# Patient Record
Sex: MALE | Race: WHITE | Employment: OTHER | URBAN - METROPOLITAN AREA
[De-identification: names, ages, dates, MRNs, and addresses within clinical notes are randomized per-mention and may not be internally consistent; named-entity substitution may affect disease eponyms.]

---

## 2017-05-02 ENCOUNTER — HOSPITAL ENCOUNTER (OUTPATIENT)
Dept: SURGERY | Age: 52
Discharge: HOME | End: 2017-05-02
Payer: COMMERCIAL

## 2017-05-02 ENCOUNTER — HOSPITAL ENCOUNTER (OUTPATIENT)
Dept: PHYSICAL THERAPY | Age: 52
Discharge: HOME | End: 2017-05-02
Payer: COMMERCIAL

## 2017-05-02 LAB
ANION GAP BLD CALC-SCNC: 6 MMOL/L (ref 7–16)
APPEARANCE UR: CLEAR
APTT PPP: 26.5 SEC (ref 23.5–31.7)
ATRIAL RATE: 81 BPM
BACTERIA SPEC CULT: NORMAL
BASOPHILS # BLD AUTO: 0 K/UL (ref 0–0.2)
BASOPHILS # BLD: 1 % (ref 0–2)
BILIRUB UR QL: NEGATIVE
BUN SERPL-MCNC: 13 MG/DL (ref 6–23)
CALCIUM SERPL-MCNC: 8.9 MG/DL (ref 8.3–10.4)
CALCULATED P AXIS, ECG09: 43 DEGREES
CALCULATED R AXIS, ECG10: -4 DEGREES
CALCULATED T AXIS, ECG11: 25 DEGREES
CHLORIDE SERPL-SCNC: 105 MMOL/L (ref 98–107)
CO2 SERPL-SCNC: 28 MMOL/L (ref 21–32)
COLOR UR: YELLOW
CREAT SERPL-MCNC: 1.11 MG/DL (ref 0.8–1.5)
DIAGNOSIS, 93000: NORMAL
DIFFERENTIAL METHOD BLD: ABNORMAL
EOSINOPHIL # BLD: 0.2 K/UL (ref 0–0.8)
EOSINOPHIL NFR BLD: 3 % (ref 0.5–7.8)
ERYTHROCYTE [DISTWIDTH] IN BLOOD BY AUTOMATED COUNT: 13.6 % (ref 11.9–14.6)
GLUCOSE SERPL-MCNC: 158 MG/DL (ref 65–100)
GLUCOSE UR STRIP.AUTO-MCNC: NEGATIVE MG/DL
HCT VFR BLD AUTO: 51.6 % (ref 41.1–50.3)
HGB BLD-MCNC: 17.7 G/DL (ref 13.6–17.2)
HGB UR QL STRIP: NEGATIVE
IMM GRANULOCYTES # BLD: 0 K/UL (ref 0–0.5)
IMM GRANULOCYTES NFR BLD AUTO: 0.7 % (ref 0–5)
INR PPP: 1 (ref 0.9–1.2)
KETONES UR QL STRIP.AUTO: NEGATIVE MG/DL
LEUKOCYTE ESTERASE UR QL STRIP.AUTO: NEGATIVE
LYMPHOCYTES # BLD AUTO: 36 % (ref 13–44)
LYMPHOCYTES # BLD: 2.2 K/UL (ref 0.5–4.6)
MCH RBC QN AUTO: 30.4 PG (ref 26.1–32.9)
MCHC RBC AUTO-ENTMCNC: 34.3 G/DL (ref 31.4–35)
MCV RBC AUTO: 88.5 FL (ref 79.6–97.8)
MONOCYTES # BLD: 0.5 K/UL (ref 0.1–1.3)
MONOCYTES NFR BLD AUTO: 8 % (ref 4–12)
NEUTS SEG # BLD: 3.2 K/UL (ref 1.7–8.2)
NEUTS SEG NFR BLD AUTO: 51 % (ref 43–78)
NITRITE UR QL STRIP.AUTO: NEGATIVE
P-R INTERVAL, ECG05: 172 MS
PH UR STRIP: 6 [PH] (ref 5–9)
PLATELET # BLD AUTO: 161 K/UL (ref 150–450)
PMV BLD AUTO: 10.9 FL (ref 10.8–14.1)
POTASSIUM SERPL-SCNC: 4.2 MMOL/L (ref 3.5–5.1)
PROT UR STRIP-MCNC: NEGATIVE MG/DL
PROTHROMBIN TIME: 10.6 SEC (ref 9.6–12)
Q-T INTERVAL, ECG07: 348 MS
QRS DURATION, ECG06: 78 MS
QTC CALCULATION (BEZET), ECG08: 404 MS
RBC # BLD AUTO: 5.83 M/UL (ref 4.23–5.67)
SERVICE CMNT-IMP: NORMAL
SODIUM SERPL-SCNC: 139 MMOL/L (ref 136–145)
SP GR UR REFRACTOMETRY: 1.02 (ref 1–1.02)
UROBILINOGEN UR QL STRIP.AUTO: 0.2 EU/DL (ref 0.2–1)
VENTRICULAR RATE, ECG03: 81 BPM
WBC # BLD AUTO: 6.1 K/UL (ref 4.3–11.1)

## 2017-05-02 PROCEDURE — 97161 PT EVAL LOW COMPLEX 20 MIN: CPT

## 2017-05-02 RX ORDER — ALPRAZOLAM 1 MG/1
1 TABLET ORAL
COMMUNITY

## 2017-05-02 RX ORDER — IBUPROFEN 200 MG
200 TABLET ORAL
COMMUNITY
End: 2017-05-25

## 2017-05-02 RX ORDER — TADALAFIL 5 MG/1
5 TABLET ORAL
COMMUNITY

## 2017-05-02 RX ORDER — ACETAMINOPHEN 325 MG/1
TABLET ORAL
COMMUNITY

## 2017-05-02 RX ORDER — ATORVASTATIN CALCIUM 20 MG/1
20 TABLET, FILM COATED ORAL DAILY
COMMUNITY

## 2017-05-02 RX ORDER — LEVOTHYROXINE SODIUM 100 UG/1
100 TABLET ORAL
COMMUNITY

## 2017-05-02 NOTE — ADVANCED PRACTICE NURSE
Total Joint Surgery Preoperative Chart Review      Patient ID:  Queta Ortiz  [id number]  89 y.o.  1965  Surgeon: Dr. Xena Curry  Date of Surgery: 5/24/2017  Procedure: Total Right Knee Arthroplasty  Primary Care Physician: Anusha Higgins [phone]  Specialty Physician(s):      Subjective:   Queta Ortiz is a 47 y.o. WHITE OR  male who presents for preoperative evaluation for Total Right Knee arthroplasty. This is a preoperative chart review note based on data collected by the nurse at the surgical Pre-Assessment visit. Past Medical History:   Diagnosis Date    Anxiety     managed with medication     Arrhythmia     A fib dx 10/31/15-IV Cardiazem given and converted to NSR. Patient took Cardiazem PO for 6 months and then discontinued. Now takes 81 mg ASA daily. Last EKG 5/2/17 NSR.  Arthritis     OA-bilateral knees     CAD (coronary artery disease)     mild MI in 2001- no intervention/no blockages; then 10/31/15 \"I drank heavily and I went into a fib\"- went to the ED- started on Cardizem- stress test normal    Chronic pain     knees    History of MI (myocardial infarction) 2001    Followed by Dr. Sharmila Horne at [facility].       Hypertension     controlled with med    Morbid obesity ([address] 6/3/16    BMI- 35.5  (verbal)    Nausea & vomiting     no problems with nausea after knee scope     Thyroid disease     hypo- on med      Past Surgical History:   Procedure Laterality Date    HX COLONOSCOPY      HX KNEE ARTHROSCOPY Left 06/2016    HX ORTHOPAEDIC Right     age 14- X 2 - ? meniscus/ cartilage    HX TONSILLECTOMY      age 23     Family History   Problem Relation Age of Onset    Diabetes Mother      type 2; insulin dep    Hypertension Mother     Heart Disease Father     Hypertension Father       Social History   Substance Use Topics    Smoking status: Never Smoker    Smokeless tobacco: Never Used    Alcohol use No      Comment: hx of heavy use       Prior to Admission medications    Medication Sig Start Date End Date Taking? Authorizing Provider   ALPRAZolam Rebecca Tashi) 1 mg tablet Take 1 mg by mouth nightly as needed for Anxiety. Yes Historical Provider   levothyroxine (SYNTHROID) 100 mcg tablet Take 100 mcg by mouth Daily (before breakfast). Take DOS per anesthesia protocol. Yes Historical Provider   atorvastatin (LIPITOR) 20 mg tablet Take 20 mg by mouth daily. Take DOS per anesthesia protocol   Yes Historical Provider   tadalafil (CIALIS) 5 mg tablet Take 5 mg by mouth daily as needed. Yes Historical Provider   ibuprofen (MOTRIN) 200 mg tablet Take 200 mg by mouth every six (6) hours as needed for Pain. Yes Historical Provider   acetaminophen (TYLENOL) 325 mg tablet Take  by mouth every four (4) hours as needed for Pain. Yes Historical Provider   enalapril (VASOTEC) 10 mg tablet Take 10 mg by mouth every morning. Yes Historical Provider   TESTOSTERONE IM by IntraMUSCular route every Wednesday. Yes Historical Provider   multivitamin (ONE A DAY) tablet Take 1 Tab by mouth daily. Last dose 6/8/16   Yes Historical Provider   aspirin delayed-release 81 mg tablet Take 81 mg by mouth every morning. Take DOS per anesthesia protocol.    Yes Historical Provider     No Known Allergies       Objective:     Physical Exam:   Patient Vitals for the past 24 hrs:   Temp Pulse Resp BP SpO2   05/02/17 1157 97 °F (36.1 °C) 82 18 156/74 95 %       ECG:    EKG Results     Procedure 720 Value Units Date/Time    EKG, 12 LEAD, INITIAL [[id number]] Collected:  05/02/17 1209    Order Status:  Completed Updated:  05/02/17 1342     Ventricular Rate 81 BPM      Atrial Rate 81 BPM      P-R Interval 172 ms      QRS Duration 78 ms      Q-T Interval 348 ms      QTC Calculation (Bezet) 404 ms      Calculated P Axis 43 degrees      Calculated R Axis -4 degrees      Calculated T Axis 25 degrees      Diagnosis --     Normal sinus rhythm  Normal ECG  No previous ECGs available  Confirmed by [address] (), Makeda Lutz ([id number]) on 5/2/2017 1:42:26 PM            Data Review:   Labs:   Recent Results (from the past 24 hour(s))   CBC WITH AUTOMATED DIFF    Collection Time: 05/02/17 11:18 AM   Result Value Ref Range    WBC 6.1 4.3 - 11.1 K/uL    RBC 5.83 (H) 4.23 - 5.67 M/uL    HGB 17.7 (H) 13.6 - 17.2 g/dL    HCT 51.6 (H) 41.1 - 50.3 %    MCV 88.5 79.6 - 97.8 FL    MCH 30.4 26.1 - 32.9 PG    MCHC 34.3 31.4 - 35.0 g/dL    RDW 13.6 11.9 - 14.6 %    PLATELET 965 910 - 928 K/uL    MPV 10.9 10.8 - 14.1 FL    DF AUTOMATED      NEUTROPHILS 51 43 - 78 %    LYMPHOCYTES 36 13 - 44 %    MONOCYTES 8 4.0 - 12.0 %    EOSINOPHILS 3 0.5 - 7.8 %    BASOPHILS 1 0.0 - 2.0 %    IMMATURE GRANULOCYTES 0.7 0.0 - 5.0 %    ABS. NEUTROPHILS 3.2 1.7 - 8.2 K/UL    ABS. LYMPHOCYTES 2.2 0.5 - 4.6 K/UL    ABS. MONOCYTES 0.5 0.1 - 1.3 K/UL    ABS. EOSINOPHILS 0.2 0.0 - 0.8 K/UL    ABS. BASOPHILS 0.0 0.0 - 0.2 K/UL    ABS. IMM.  GRANS. 0.0 0.0 - 0.5 K/UL   PROTHROMBIN TIME + INR    Collection Time: 05/02/17 11:18 AM   Result Value Ref Range    Prothrombin time 10.6 9.6 - 12.0 sec    INR 1.0 0.9 - 1.2     PTT    Collection Time: 05/02/17 11:18 AM   Result Value Ref Range    aPTT 26.5 23.5 - 97.4 SEC   METABOLIC PANEL, BASIC    Collection Time: 05/02/17 11:18 AM   Result Value Ref Range    Sodium 139 136 - 145 mmol/L    Potassium 4.2 3.5 - 5.1 mmol/L    Chloride 105 98 - 107 mmol/L    CO2 28 21 - 32 mmol/L    Anion gap 6 (L) 7 - 16 mmol/L    Glucose 158 (H) 65 - 100 mg/dL    BUN 13 6 - 23 MG/DL    Creatinine 1.11 0.8 - 1.5 MG/DL    GFR est AA >60 >60 ml/min/1.73m2    GFR est non-AA >60 >60 ml/min/1.73m2    Calcium 8.9 8.3 - 10.4 MG/DL   URINALYSIS W/ RFLX MICROSCOPIC    Collection Time: 05/02/17 11:18 AM   Result Value Ref Range    Color YELLOW      Appearance CLEAR      Specific gravity 1.020 1.001 - 1.023      pH (UA) 6.0 5.0 - 9.0      Protein NEGATIVE  NEG mg/dL    Glucose NEGATIVE  mg/dL    Ketone NEGATIVE  NEG mg/dL    Bilirubin NEGATIVE NEG      Blood NEGATIVE  NEG      Urobilinogen 0.2 0.2 - 1.0 EU/dL    Nitrites NEGATIVE  NEG      Leukocyte Esterase NEGATIVE  NEG     EKG, 12 LEAD, INITIAL    Collection Time: 05/02/17 12:09 PM   Result Value Ref Range    Ventricular Rate 81 BPM    Atrial Rate 81 BPM    P-R Interval 172 ms    QRS Duration 78 ms    Q-T Interval 348 ms    QTC Calculation (Bezet) 404 ms    Calculated P Axis 43 degrees    Calculated R Axis -4 degrees    Calculated T Axis 25 degrees    Diagnosis       Normal sinus rhythm  Normal ECG  No previous ECGs available  Confirmed by Otilio Pope MD (), Riley Covarrubias ([id number]) on 5/2/2017 1:42:26 PM           Problem List:  )There is no problem list on file for this patient. Total Joint Surgery Pre-Assessment Recommendations:           He is a moderate risk for sleep apnea but refuses to have additional work up at this time. Will initiate perioperative STAN precautions. Recommend continuous saturation monitoring hours of sleep, during hospitalization.           Signed By: GREGORIO Ty    May 2, 2017

## 2017-05-02 NOTE — PERIOP NOTES
Labs dated 5/2/17 routed via [facility] to patients PCP, Dr. Eber La, per Dr. Rosa Jara' request.

## 2017-05-02 NOTE — PROGRESS NOTES
Mahamed Alert  : (57 y.o.) [address] at [facility]  [address], [address]  [phone]       Physical Therapy Prehab Plan of Treatment and Evaluation Summary:2017    ICD-10: Treatment Diagnosis:   · Pain in Right Knee (M25.561)  · Stiffness of Right Knee, Not elsewhere classified (M25.661)  · Difficulty in walking, Not elsewhere classified (R26.2)  Precautions/Allergies:   Review of patient's allergies indicates no known allergies. MEDICAL/REFERRING DIAGNOSIS:  Unilateral primary osteoarthritis, right knee [M17.11]  REFERRING PHYSICIAN: Teresa Johnson., *  DATE OF SURGERY: 17   Assessment:   Comments:  Pt. Plans to go home with spouse. PROBLEM LIST (Impacting functional limitations):  Mr. Stacey Owens presents with the following right lower extremity(s) problems:  1. Strength  2. Range of Motion  3. Home Exercise Program  4. Pain   INTERVENTIONS PLANNED:  1. Home Exercise Program  2. Educational Discussion     TREATMENT PLAN: Effective Dates: 2017 TO 2017. Frequency/Duration: Patient to continue to perform home exercise program at least twice per day up until his surgery. GOALS: (Goals have been discussed and agreed upon with patient.)  Discharge Goals: Time Frame: 1 Day  1. Patient will demonstrate independence with a home exercise program designed to increase strength, range of motion and pain control to minimize functional deficits and optimize patient for total joint replacement. Rehabilitation Potential For Stated Goals: Good  Regarding [place] therapy, I certify that the treatment plan above will be carried out by a therapist or under their direction.   Thank you for this referral,  Ash Sheets, PT               HISTORY:   Present Symptoms:  Pain Intensity 1:  (7 at worst)  Pain Location 1: Knee   History of Present Injury/Illness (Reason for Referral):  Medical/Referring Diagnosis: Unilateral primary osteoarthritis, right knee [M17.11]   Past Medical History/Comorbidities:   Mr. Hardy Jimenes  has a past medical history of Anxiety; Arrhythmia; Arthritis; CAD (coronary artery disease); Chronic pain; History of MI (myocardial infarction) (2001); Hypertension; Morbid obesity ([facility] [address] (6/3/16); Nausea & vomiting; and Thyroid disease. He also has no past medical history of Adverse effect of anesthesia; Difficult intubation; Malignant hyperthermia due to anesthesia; or Pseudocholinesterase deficiency. Mr. Hardy Jimenes  has a past surgical history that includes orthopaedic (Right); tonsillectomy; knee arthroscopy (Left, 06/2016); and colonoscopy.   Social History/Living Environment:   Home Environment: Private residence  # Steps to Enter: 1  Rails to Enter: No  One/Two Story Residence: One story  Living Alone: No  Support Systems: Spouse/Significant Other/Partner  Patient Expects to be Discharged to[de-identified] Private residence  Current DME Used/Available at Home: None  Tub or Shower Type: Shower  Work/Activity:  Owns a restaurant  Dominant Side:  RIGHT  Current Medications:  See Pre-assessment nursing note   Number of Personal Factors/Comorbidities that affect the Plan of Care: 0: LOW COMPLEXITY   EXAMINATION:   ADLs (Current Functional Status):   Ambulation:  [x] Independent  [] Walk Indoors Only  [] Walk Outdoors  [] Use Assistive Device  [] Use Wheelchair Only Dressing:  [x] [address] from Someone for:  [] Sock/Shoes  [] Pants  [] Everything   Bathing/Showering:   [x] Independent  [] Requires Assistance from Someone  [] [address] Only Household Activities:  [x] Routine house and yard work  [] Light Housework Only  [] None   Observation/Orthostatic Postural Assessment:   Exceptions to Auditude shoulders  ROM/Flexibility:   Gross Assessment: Yes  AROM: Within functional limits (left LE)                       RLE Assessment  RLE Assessment (WDL): Exceptions to WDL  RLE AROM  R Knee Flexion: 125  R Knee Extension: 3   Strength:   Gross Assessment: Yes  Strength: Within functional limits (left LE)              RLE Strength  R Knee Flexion: 4  R Knee Extension: 4   Functional Mobility:    Gross Assessment: Yes    Gait Description (WDL): Exceptions to WDL  Stand to Sit: Independent, Additional time  Sit to Stand: Independent, Additional time  Bed to Chair: Independent, Additional time  Distance (ft): 250 Feet (ft)  Ambulation - Level of Assistance: Independent  Stance: Right decreased  Gait Abnormalities: Antalgic          Balance:    Sitting: Intact  Standing: Intact   Body Structures Involved:  1. Bones  2. Joints  3. Muscles  4. Ligaments Body Functions Affected:  1. Movement Related Activities and Participation Affected:  1. Mobility   Number of elements that affect the Plan of Care: 1-2: LOW COMPLEXITY   CLINICAL PRESENTATION:   Presentation: Stable and uncomplicated: LOW COMPLEXITY   CLINICAL DECISION MAKING:   Outcome Measure: Tool Used: Lower Extremity Functional Scale (LEFS)  Score:  Initial: 51/80 Most Recent: X/80 (Date: -- )   Interpretation of Score: 20 questions each scored on a 5 point scale with 0 representing \"extreme difficulty or unable to perform\" and 4 representing \"no difficulty\". The lower the score, the greater the functional disability. 80/80 represents no disability. Minimal detectable change is 9 points. Score 80 79-65 64-49 48-33 32-17 16-1 0   Modifier CH CI CJ CK CL CM CN     ? Mobility - Walking and Moving Around:     - CURRENT STATUS: CJ - 20%-39% impaired, limited or restricted    - GOAL STATUS: CJ - 20%-39% impaired, limited or restricted    - D/C STATUS:  CJ - 20%-39% impaired, limited or restricted  Medical Necessity:   · Mr. Hardy Jimenes is expected to optimize his lower extremity strength and ROM in preparation for joint replacement surgery.   Reason for Services/Other Comments:  · Achieve baseline assesment of musculoskeletal system, functional mobility and home environment. , educate in PT HEP in preparation for surgery, educate in hospital plan of care. Use of outcome tool(s) and clinical judgement create a POC that gives a: Clear prediction of patient's progress: LOW COMPLEXITY   TREATMENT:   Treatment/Session Assessment:  Patient was instructed in PT- HEP to increase strength and ROM in LEs. Answered all questions. · Post session pain:  No complaints  · Compliance with Program/Exercises: compliant most of the time.   Total Treatment Duration:  PT Patient Time In/Time Out  Time In: 1050  Time Out: [address], PT

## 2017-05-02 NOTE — PERIOP NOTES
Patient verified name, , and surgery as listed in [facility]. Type 3 surgery, PAT joint assessment complete. Labs per surgeon: cbc, bmp, ua, pt/inr, ptt, mrsa/mssa swab, T&S DOS; results within anesthesia limits. MRSA/MSSA swab results pending-charge nurse to follow-up. Labs per anesthesia protocol: All required lab work included in surgeon's orders. EKG: completed 17; results within anesthesia limits. Previous Cardiology note (6/10/16), Echo (11/2/15), Stress (11/23/15), and EKG (11/17/15) placed on chart for anesthesia reference. Hibiclens and instructions given per hospital policy. Nasal Swab collected per MD order and instructions for Mupirocin nasal ointment if required. Patient provided with handouts including Guide to Surgery, Pain Management, Hand Hygiene, Blood Transfusion Education, and [place] [facility] Brochure. Patient answered medical/surgical history questions at their best of ability. All prior to admission medications documented in [facility]. Original medication prescription bottle NOT visualized during patient appointment. Patient instructed to hold all vitamins 7 days prior to surgery and NSAIDS 5 days prior to surgery. Medications to be held prior to surgery: Multivitamin and Ibuprofen. Patient instructed to continue previous medications as prescribed prior to surgery and to take the following medications the day of surgery according to anesthesia guidelines with a small sip of water: Tylenol if needed, 81 mg ASA, Atorvastatin, and Synthroid. Patient taught back and verbalized understanding.

## 2017-05-02 NOTE — PERIOP NOTES
Recent Results (from the past 12 hour(s))   CBC WITH AUTOMATED DIFF    Collection Time: 05/02/17 11:18 AM   Result Value Ref Range    WBC 6.1 4.3 - 11.1 K/uL    RBC 5.83 (H) 4.23 - 5.67 M/uL    HGB 17.7 (H) 13.6 - 17.2 g/dL    HCT 51.6 (H) 41.1 - 50.3 %    MCV 88.5 79.6 - 97.8 FL    MCH 30.4 26.1 - 32.9 PG    MCHC 34.3 31.4 - 35.0 g/dL    RDW 13.6 11.9 - 14.6 %    PLATELET 991 165 - 205 K/uL    MPV 10.9 10.8 - 14.1 FL    DF AUTOMATED      NEUTROPHILS 51 43 - 78 %    LYMPHOCYTES 36 13 - 44 %    MONOCYTES 8 4.0 - 12.0 %    EOSINOPHILS 3 0.5 - 7.8 %    BASOPHILS 1 0.0 - 2.0 %    IMMATURE GRANULOCYTES 0.7 0.0 - 5.0 %    ABS. NEUTROPHILS 3.2 1.7 - 8.2 K/UL    ABS. LYMPHOCYTES 2.2 0.5 - 4.6 K/UL    ABS. MONOCYTES 0.5 0.1 - 1.3 K/UL    ABS. EOSINOPHILS 0.2 0.0 - 0.8 K/UL    ABS. BASOPHILS 0.0 0.0 - 0.2 K/UL    ABS. IMM.  GRANS. 0.0 0.0 - 0.5 K/UL   PROTHROMBIN TIME + INR    Collection Time: 05/02/17 11:18 AM   Result Value Ref Range    Prothrombin time 10.6 9.6 - 12.0 sec    INR 1.0 0.9 - 1.2     PTT    Collection Time: 05/02/17 11:18 AM   Result Value Ref Range    aPTT 26.5 23.5 - 00.0 SEC   METABOLIC PANEL, BASIC    Collection Time: 05/02/17 11:18 AM   Result Value Ref Range    Sodium 139 136 - 145 mmol/L    Potassium 4.2 3.5 - 5.1 mmol/L    Chloride 105 98 - 107 mmol/L    CO2 28 21 - 32 mmol/L    Anion gap 6 (L) 7 - 16 mmol/L    Glucose 158 (H) 65 - 100 mg/dL    BUN 13 6 - 23 MG/DL    Creatinine 1.11 0.8 - 1.5 MG/DL    GFR est AA >60 >60 ml/min/1.73m2    GFR est non-AA >60 >60 ml/min/1.73m2    Calcium 8.9 8.3 - 10.4 MG/DL   URINALYSIS W/ RFLX MICROSCOPIC    Collection Time: 05/02/17 11:18 AM   Result Value Ref Range    Color YELLOW      Appearance CLEAR      Specific gravity 1.020 1.001 - 1.023      pH (UA) 6.0 5.0 - 9.0      Protein NEGATIVE  NEG mg/dL    Glucose NEGATIVE  mg/dL    Ketone NEGATIVE  NEG mg/dL    Bilirubin NEGATIVE  NEG      Blood NEGATIVE  NEG      Urobilinogen 0.2 0.2 - 1.0 EU/dL    Nitrites NEGATIVE NEG      Leukocyte Esterase NEGATIVE  NEG     EKG, 12 LEAD, INITIAL    Collection Time: 05/02/17 12:09 PM   Result Value Ref Range    Ventricular Rate 81 BPM    Atrial Rate 81 BPM    P-R Interval 172 ms    QRS Duration 78 ms    Q-T Interval 348 ms    QTC Calculation (Bezet) 404 ms    Calculated P Axis 43 degrees    Calculated R Axis -4 degrees    Calculated T Axis 25 degrees    Diagnosis       Normal sinus rhythm  Normal ECG  No previous ECGs available

## 2017-05-02 NOTE — PERIOP NOTES
Previous office note, ekg, stress test, and echo requested from [facility] ([phone]) to be faxed to [phone].

## 2017-05-02 NOTE — PERIOP NOTES
Received previous cardiology note (6/10/16), Echo (11/02/15), Stress (11/23/15) and EKG (11/17/15) from [address] [place] [address]. Records placed on chart for anesthesia reference.

## 2017-05-03 VITALS
SYSTOLIC BLOOD PRESSURE: 156 MMHG | BODY MASS INDEX: 36.29 KG/M2 | WEIGHT: 245 LBS | HEIGHT: 69 IN | DIASTOLIC BLOOD PRESSURE: 74 MMHG | OXYGEN SATURATION: 95 % | RESPIRATION RATE: 18 BRPM | HEART RATE: 82 BPM | TEMPERATURE: 97 F

## 2017-05-03 NOTE — PERIOP NOTES
5/2/2017  4:59 PM - Neftali, Lab In [facility]   Component Results   Component Value Flag Ref Range Units Status   Special Requests: NASAL     Final   Culture result:      Final   SA target not detected.                                 A MRSA NEGATIVE, SA NEGATIVE test result does not preclude MRSA or SA nasal colonization.

## 2017-05-03 NOTE — PROGRESS NOTES
05/02/17 1030   Oxygen Therapy   O2 Sat (%) 97 %   Pulse via Oximetry 89 beats per minute   O2 Device Room air   Pre-Treatment   Breath Sounds Bilateral Clear   Pre FEV1 (liters) 2.7 liters   % Predicted 72     Initial respiratory Assessment completed with pt. Pt was interviewed and evaluated in Joint camp prior to surgery. Patient ID:  Ernestina Paredes  [id number]  43 y.o.  1965  Surgeon: Dr. Renata Jeffries  Date of Surgery: 5/24/2017  Procedure: Total Right Knee Arthroplasty  Primary Care Physician: Genie Salmon -[phone]  Specialists:                                  Pt instructed in the use of Incentive Spirometry. Pt instructed to bring Incentive Spirometer back on date of surgery & to start using Is upon return to pt room. Pt taught proper cough technique      History of smoking:   MARIJUANA IN PAST     Quit date:    Secondhand smoke:      Past procedures with Oxygen desaturation:NONE    Past Medical History:   Diagnosis Date    Abnormal hemoglobin (Hgb) ([facility] [address] 05/02/2017    Hgb 17.7-patient takes weekly testosterone injection     Anxiety     managed with medication     Arrhythmia     [place] fib dx 10/31/15 after binge drinking. IV Cardiazem given and converted to NSR. Patient took Cardiazem PO for 6 months and then discontinued. Now takes 81 mg ASA daily. Last EKG 5/2/17 NSR. No hx of cardioversion.  Arthritis     OA-bilateral knees     CAD (coronary artery disease)     mild MI per patient in 2001- no intervention/no blockages; then 10/31/15 \"I drank heavily and I went into a fib\"- went to the ED- started on Cardizem- stress test normal    Chronic pain     knees    H/O echocardiogram 11/02/2015    EF 55-60%, Trace mitral regurgitation, Mild tricuspid regurgitation, diastolic dysfunction grade II.  History of MI (myocardial infarction) 2001    Followed by Dr. Winston Gonzalez at [facility].       Hypertension     controlled with med    Morbid obesity ([facility] [address] 06/03/2016    Nausea & vomiting     no problems with nausea after knee scope     Thyroid disease     hypo- on med                                                                                                                                                          Respiratory history:                                  DENIES SOB                                  Respiratory meds:                                                         FAMILY PRESENT:                   NO                                        PAST SLEEP STUDY:             NO  HX OF STAN:                             NO                                     STAN assessment:                                               SLEEPS ON    STOMACH                                                 PHYSICAL EXAM   Body mass index is 36.18 kg/(m^2).    Visit Vitals    /74 (BP 1 Location: Left arm, BP Patient Position: At rest;Sitting)    Pulse 82    Temp 97 °F (36.1 °C)    Resp 18    Ht 5' 9\" (1.753 m)    Wt 111.1 kg (245 lb)    SpO2 95%    BMI 36.18 kg/m2     Neck circumference:  48    cm    Loud snoring: DENIES    Witnessed apnea or wakening gasping or choking:,DENIES,     Awakens with headaches:DENIES    Morning or daytime tiredness/ sleepiness:   TIRED   NAPS 2 HOURS TWICE A WEEK    Dry mouth or sore throat in morning: DENIES    Freidman stage:3    SACS score:29    STOP/BAN                              CPAP:NONE               CONT SAT HS          Referrals:    Pt. Phone Number:

## 2017-05-23 ENCOUNTER — ANESTHESIA EVENT (OUTPATIENT)
Dept: SURGERY | Age: 52
DRG: 470 | End: 2017-05-23
Payer: COMMERCIAL

## 2017-05-23 RX ORDER — ONDANSETRON 2 MG/ML
4 INJECTION INTRAMUSCULAR; INTRAVENOUS
Status: CANCELLED | OUTPATIENT
Start: 2017-05-23

## 2017-05-23 RX ORDER — NALBUPHINE HYDROCHLORIDE 20 MG/ML
5 INJECTION, SOLUTION INTRAMUSCULAR; INTRAVENOUS; SUBCUTANEOUS
Status: CANCELLED | OUTPATIENT
Start: 2017-05-23

## 2017-05-23 RX ORDER — NALOXONE HYDROCHLORIDE 0.4 MG/ML
0.1 INJECTION, SOLUTION INTRAMUSCULAR; INTRAVENOUS; SUBCUTANEOUS
Status: CANCELLED | OUTPATIENT
Start: 2017-05-23

## 2017-05-23 RX ORDER — HYDROMORPHONE HYDROCHLORIDE 2 MG/ML
0.5 INJECTION, SOLUTION INTRAMUSCULAR; INTRAVENOUS; SUBCUTANEOUS
Status: CANCELLED | OUTPATIENT
Start: 2017-05-23

## 2017-05-23 RX ORDER — FLUMAZENIL 0.1 MG/ML
0.2 INJECTION INTRAVENOUS
Status: CANCELLED | OUTPATIENT
Start: 2017-05-23

## 2017-05-23 RX ORDER — SODIUM CHLORIDE 0.9 % (FLUSH) 0.9 %
5-10 SYRINGE (ML) INJECTION AS NEEDED
Status: CANCELLED | OUTPATIENT
Start: 2017-05-23

## 2017-05-23 RX ORDER — OXYCODONE HYDROCHLORIDE 5 MG/1
5 TABLET ORAL
Status: CANCELLED | OUTPATIENT
Start: 2017-05-23

## 2017-05-24 ENCOUNTER — HOSPITAL ENCOUNTER (INPATIENT)
Age: 52
LOS: 1 days | Discharge: HOME HEALTH | DRG: 470 | End: 2017-05-25
Attending: ORTHOPAEDIC SURGERY | Admitting: ORTHOPAEDIC SURGERY
Payer: COMMERCIAL

## 2017-05-24 ENCOUNTER — ANESTHESIA (OUTPATIENT)
Dept: SURGERY | Age: 52
DRG: 470 | End: 2017-05-24
Payer: COMMERCIAL

## 2017-05-24 DIAGNOSIS — Z96.651 STATUS POST TOTAL RIGHT KNEE REPLACEMENT: Primary | ICD-10-CM

## 2017-05-24 LAB
ABO + RH BLD: NORMAL
BLOOD GROUP ANTIBODIES SERPL: NORMAL
GLUCOSE BLD STRIP.AUTO-MCNC: 105 MG/DL (ref 65–100)
HGB BLD-MCNC: 16.8 G/DL (ref 13.6–17.2)
SPECIMEN EXP DATE BLD: NORMAL

## 2017-05-24 PROCEDURE — 74011250636 HC RX REV CODE- 250/636: Performed by: ANESTHESIOLOGY

## 2017-05-24 PROCEDURE — 77030019557 HC ELECTRD VES SEAL MEDT -F: Performed by: ORTHOPAEDIC SURGERY

## 2017-05-24 PROCEDURE — 77030003602 HC NDL NRV BLK BBMI -B: Performed by: ANESTHESIOLOGY

## 2017-05-24 PROCEDURE — 74011000250 HC RX REV CODE- 250: Performed by: ANESTHESIOLOGY

## 2017-05-24 PROCEDURE — 76010010054 HC POST OP PAIN BLOCK: Performed by: ORTHOPAEDIC SURGERY

## 2017-05-24 PROCEDURE — 76010000162 HC OR TIME 1.5 TO 2 HR INTENSV-TIER 1: Performed by: ORTHOPAEDIC SURGERY

## 2017-05-24 PROCEDURE — 77030034849: Performed by: ORTHOPAEDIC SURGERY

## 2017-05-24 PROCEDURE — 74011000302 HC RX REV CODE- 302: Performed by: ORTHOPAEDIC SURGERY

## 2017-05-24 PROCEDURE — 65270000029 HC RM PRIVATE

## 2017-05-24 PROCEDURE — 77030012935 HC DRSG AQUACEL BMS -B: Performed by: ORTHOPAEDIC SURGERY

## 2017-05-24 PROCEDURE — 77030011208: Performed by: ORTHOPAEDIC SURGERY

## 2017-05-24 PROCEDURE — 76942 ECHO GUIDE FOR BIOPSY: CPT | Performed by: ORTHOPAEDIC SURGERY

## 2017-05-24 PROCEDURE — 94762 N-INVAS EAR/PLS OXIMTRY CONT: CPT

## 2017-05-24 PROCEDURE — 77030031139 HC SUT VCRL2 J&J -A: Performed by: ORTHOPAEDIC SURGERY

## 2017-05-24 PROCEDURE — 77030003665 HC NDL SPN BBMI -A: Performed by: ANESTHESIOLOGY

## 2017-05-24 PROCEDURE — 77030008467 HC STPLR SKN COVD -B: Performed by: ORTHOPAEDIC SURGERY

## 2017-05-24 PROCEDURE — 77030007880 HC KT SPN EPDRL BBMI -B: Performed by: ANESTHESIOLOGY

## 2017-05-24 PROCEDURE — 77030011640 HC PAD GRND REM COVD -A: Performed by: ORTHOPAEDIC SURGERY

## 2017-05-24 PROCEDURE — 77030006789 HC BLD SAW OSC STRY -C: Performed by: ORTHOPAEDIC SURGERY

## 2017-05-24 PROCEDURE — 86900 BLOOD TYPING SEROLOGIC ABO: CPT | Performed by: PHYSICIAN ASSISTANT

## 2017-05-24 PROCEDURE — 85018 HEMOGLOBIN: CPT | Performed by: PHYSICIAN ASSISTANT

## 2017-05-24 PROCEDURE — 77030002966 HC SUT PDS J&J -A: Performed by: ORTHOPAEDIC SURGERY

## 2017-05-24 PROCEDURE — 0SRC0J9 REPLACEMENT OF RIGHT KNEE JOINT WITH SYNTHETIC SUBSTITUTE, CEMENTED, OPEN APPROACH: ICD-10-PCS | Performed by: ORTHOPAEDIC SURGERY

## 2017-05-24 PROCEDURE — 82962 GLUCOSE BLOOD TEST: CPT

## 2017-05-24 PROCEDURE — 77030006720 HC BLD PAT RMR ZIMM -B: Performed by: ORTHOPAEDIC SURGERY

## 2017-05-24 PROCEDURE — 74011250636 HC RX REV CODE- 250/636: Performed by: ORTHOPAEDIC SURGERY

## 2017-05-24 PROCEDURE — 77030020782 HC GWN BAIR PAWS FLX 3M -B: Performed by: ANESTHESIOLOGY

## 2017-05-24 PROCEDURE — 74011000250 HC RX REV CODE- 250

## 2017-05-24 PROCEDURE — 77030002912 HC SUT ETHBND J&J -A: Performed by: ORTHOPAEDIC SURGERY

## 2017-05-24 PROCEDURE — 76060000034 HC ANESTHESIA 1.5 TO 2 HR: Performed by: ORTHOPAEDIC SURGERY

## 2017-05-24 PROCEDURE — 86580 TB INTRADERMAL TEST: CPT | Performed by: ORTHOPAEDIC SURGERY

## 2017-05-24 PROCEDURE — 74011250636 HC RX REV CODE- 250/636

## 2017-05-24 PROCEDURE — 94760 N-INVAS EAR/PLS OXIMETRY 1: CPT

## 2017-05-24 PROCEDURE — C1776 JOINT DEVICE (IMPLANTABLE): HCPCS | Performed by: ORTHOPAEDIC SURGERY

## 2017-05-24 PROCEDURE — 77030012890

## 2017-05-24 PROCEDURE — 77030035236 HC SUT PDS STRATFX BARB J&J -B: Performed by: ORTHOPAEDIC SURGERY

## 2017-05-24 PROCEDURE — 74011250636 HC RX REV CODE- 250/636: Performed by: PHYSICIAN ASSISTANT

## 2017-05-24 PROCEDURE — 74011000250 HC RX REV CODE- 250: Performed by: ORTHOPAEDIC SURGERY

## 2017-05-24 PROCEDURE — 77030013727 HC IRR FAN PULSVC ZIMM -B: Performed by: ORTHOPAEDIC SURGERY

## 2017-05-24 PROCEDURE — 77010033678 HC OXYGEN DAILY

## 2017-05-24 PROCEDURE — 36415 COLL VENOUS BLD VENIPUNCTURE: CPT | Performed by: PHYSICIAN ASSISTANT

## 2017-05-24 PROCEDURE — 74011000258 HC RX REV CODE- 258: Performed by: ORTHOPAEDIC SURGERY

## 2017-05-24 PROCEDURE — 76210000016 HC OR PH I REC 1 TO 1.5 HR: Performed by: ORTHOPAEDIC SURGERY

## 2017-05-24 PROCEDURE — 77030018836 HC SOL IRR NACL ICUM -A: Performed by: ORTHOPAEDIC SURGERY

## 2017-05-24 PROCEDURE — 74011250637 HC RX REV CODE- 250/637: Performed by: ANESTHESIOLOGY

## 2017-05-24 PROCEDURE — 77030032490 HC SLV COMPR SCD KNE COVD -B

## 2017-05-24 DEVICE — COMPONENT PAT DIA35MM THK10MM SUPERIOR/INFERIOR KNEE: Type: IMPLANTABLE DEVICE | Site: KNEE | Status: FUNCTIONAL

## 2017-05-24 DEVICE — INSERT TIB SZ 5 THK11MM UNIV KNEE POLYETH CNDYL STBL PRI: Type: IMPLANTABLE DEVICE | Site: KNEE | Status: FUNCTIONAL

## 2017-05-24 DEVICE — IMPLANTABLE DEVICE: Type: IMPLANTABLE DEVICE | Site: KNEE | Status: FUNCTIONAL

## 2017-05-24 DEVICE — BASEPLT TIB PC TRITNM SZ 5 -- TRIATHLON: Type: IMPLANTABLE DEVICE | Site: KNEE | Status: FUNCTIONAL

## 2017-05-24 RX ORDER — ONDANSETRON 2 MG/ML
INJECTION INTRAMUSCULAR; INTRAVENOUS AS NEEDED
Status: DISCONTINUED | OUTPATIENT
Start: 2017-05-24 | End: 2017-05-24 | Stop reason: HOSPADM

## 2017-05-24 RX ORDER — LEVOTHYROXINE SODIUM 100 UG/1
100 TABLET ORAL
Status: DISCONTINUED | OUTPATIENT
Start: 2017-05-25 | End: 2017-05-25 | Stop reason: HOSPADM

## 2017-05-24 RX ORDER — MIDAZOLAM HYDROCHLORIDE 1 MG/ML
2 INJECTION, SOLUTION INTRAMUSCULAR; INTRAVENOUS
Status: DISCONTINUED | OUTPATIENT
Start: 2017-05-24 | End: 2017-05-24 | Stop reason: HOSPADM

## 2017-05-24 RX ORDER — ONDANSETRON 2 MG/ML
4 INJECTION INTRAMUSCULAR; INTRAVENOUS
Status: DISCONTINUED | OUTPATIENT
Start: 2017-05-24 | End: 2017-05-25 | Stop reason: HOSPADM

## 2017-05-24 RX ORDER — KETOROLAC TROMETHAMINE 30 MG/ML
INJECTION, SOLUTION INTRAMUSCULAR; INTRAVENOUS AS NEEDED
Status: DISCONTINUED | OUTPATIENT
Start: 2017-05-24 | End: 2017-05-24 | Stop reason: HOSPADM

## 2017-05-24 RX ORDER — MORPHINE SULFATE 10 MG/ML
INJECTION, SOLUTION INTRAMUSCULAR; INTRAVENOUS AS NEEDED
Status: DISCONTINUED | OUTPATIENT
Start: 2017-05-24 | End: 2017-05-24 | Stop reason: HOSPADM

## 2017-05-24 RX ORDER — PROMETHAZINE HYDROCHLORIDE 25 MG/ML
12.5 INJECTION, SOLUTION INTRAMUSCULAR; INTRAVENOUS
Status: DISCONTINUED | OUTPATIENT
Start: 2017-05-24 | End: 2017-05-25 | Stop reason: HOSPADM

## 2017-05-24 RX ORDER — TRANEXAMIC ACID 100 MG/ML
INJECTION, SOLUTION INTRAVENOUS AS NEEDED
Status: DISCONTINUED | OUTPATIENT
Start: 2017-05-24 | End: 2017-05-24 | Stop reason: HOSPADM

## 2017-05-24 RX ORDER — DEXAMETHASONE SODIUM PHOSPHATE 100 MG/10ML
10 INJECTION INTRAMUSCULAR; INTRAVENOUS ONCE
Status: DISCONTINUED | OUTPATIENT
Start: 2017-05-25 | End: 2017-05-25 | Stop reason: HOSPADM

## 2017-05-24 RX ORDER — SODIUM CHLORIDE, SODIUM LACTATE, POTASSIUM CHLORIDE, CALCIUM CHLORIDE 600; 310; 30; 20 MG/100ML; MG/100ML; MG/100ML; MG/100ML
100 INJECTION, SOLUTION INTRAVENOUS CONTINUOUS
Status: DISCONTINUED | OUTPATIENT
Start: 2017-05-24 | End: 2017-05-24 | Stop reason: HOSPADM

## 2017-05-24 RX ORDER — NALOXONE HYDROCHLORIDE 0.4 MG/ML
.2-.4 INJECTION, SOLUTION INTRAMUSCULAR; INTRAVENOUS; SUBCUTANEOUS
Status: DISCONTINUED | OUTPATIENT
Start: 2017-05-24 | End: 2017-05-25 | Stop reason: HOSPADM

## 2017-05-24 RX ORDER — SODIUM CHLORIDE 0.9 % (FLUSH) 0.9 %
5-10 SYRINGE (ML) INJECTION AS NEEDED
Status: DISCONTINUED | OUTPATIENT
Start: 2017-05-24 | End: 2017-05-24 | Stop reason: HOSPADM

## 2017-05-24 RX ORDER — SODIUM CHLORIDE 0.9 % (FLUSH) 0.9 %
5-10 SYRINGE (ML) INJECTION EVERY 8 HOURS
Status: DISCONTINUED | OUTPATIENT
Start: 2017-05-24 | End: 2017-05-25 | Stop reason: HOSPADM

## 2017-05-24 RX ORDER — CEFAZOLIN SODIUM IN 0.9 % NACL 2 G/50 ML
2 INTRAVENOUS SOLUTION, PIGGYBACK (ML) INTRAVENOUS EVERY 8 HOURS
Status: COMPLETED | OUTPATIENT
Start: 2017-05-24 | End: 2017-05-25

## 2017-05-24 RX ORDER — PROPOFOL 10 MG/ML
INJECTION, EMULSION INTRAVENOUS
Status: DISCONTINUED | OUTPATIENT
Start: 2017-05-24 | End: 2017-05-24 | Stop reason: HOSPADM

## 2017-05-24 RX ORDER — LIDOCAINE HYDROCHLORIDE 10 MG/ML
0.1 INJECTION INFILTRATION; PERINEURAL AS NEEDED
Status: DISCONTINUED | OUTPATIENT
Start: 2017-05-24 | End: 2017-05-24 | Stop reason: HOSPADM

## 2017-05-24 RX ORDER — MIDAZOLAM HYDROCHLORIDE 1 MG/ML
2 INJECTION, SOLUTION INTRAMUSCULAR; INTRAVENOUS ONCE
Status: COMPLETED | OUTPATIENT
Start: 2017-05-24 | End: 2017-05-24

## 2017-05-24 RX ORDER — OXYCODONE HCL 10 MG/1
10 TABLET, FILM COATED, EXTENDED RELEASE ORAL EVERY 12 HOURS
Status: DISCONTINUED | OUTPATIENT
Start: 2017-05-25 | End: 2017-05-25

## 2017-05-24 RX ORDER — TRAMADOL HYDROCHLORIDE 50 MG/1
50 TABLET ORAL
Status: DISCONTINUED | OUTPATIENT
Start: 2017-05-24 | End: 2017-05-25 | Stop reason: HOSPADM

## 2017-05-24 RX ORDER — HYDROMORPHONE HYDROCHLORIDE 1 MG/ML
1 INJECTION, SOLUTION INTRAMUSCULAR; INTRAVENOUS; SUBCUTANEOUS
Status: DISCONTINUED | OUTPATIENT
Start: 2017-05-24 | End: 2017-05-25 | Stop reason: HOSPADM

## 2017-05-24 RX ORDER — ACETAMINOPHEN 500 MG
1000 TABLET ORAL EVERY 6 HOURS
Status: DISCONTINUED | OUTPATIENT
Start: 2017-05-25 | End: 2017-05-25 | Stop reason: HOSPADM

## 2017-05-24 RX ORDER — CEFAZOLIN SODIUM IN 0.9 % NACL 2 G/50 ML
2 INTRAVENOUS SOLUTION, PIGGYBACK (ML) INTRAVENOUS ONCE
Status: COMPLETED | OUTPATIENT
Start: 2017-05-24 | End: 2017-05-24

## 2017-05-24 RX ORDER — SODIUM CHLORIDE 0.9 % (FLUSH) 0.9 %
5-10 SYRINGE (ML) INJECTION EVERY 8 HOURS
Status: DISCONTINUED | OUTPATIENT
Start: 2017-05-24 | End: 2017-05-24 | Stop reason: HOSPADM

## 2017-05-24 RX ORDER — ROPIVACAINE HYDROCHLORIDE 2 MG/ML
INJECTION, SOLUTION EPIDURAL; INFILTRATION; PERINEURAL AS NEEDED
Status: DISCONTINUED | OUTPATIENT
Start: 2017-05-24 | End: 2017-05-24 | Stop reason: HOSPADM

## 2017-05-24 RX ORDER — CELECOXIB 200 MG/1
200 CAPSULE ORAL EVERY 12 HOURS
Status: DISCONTINUED | OUTPATIENT
Start: 2017-05-24 | End: 2017-05-25 | Stop reason: HOSPADM

## 2017-05-24 RX ORDER — NEOMYCIN AND POLYMYXIN B SULFATES 40; 200000 MG/ML; [USP'U]/ML
SOLUTION IRRIGATION AS NEEDED
Status: DISCONTINUED | OUTPATIENT
Start: 2017-05-24 | End: 2017-05-24 | Stop reason: HOSPADM

## 2017-05-24 RX ORDER — AMOXICILLIN 250 MG
2 CAPSULE ORAL DAILY
Status: DISCONTINUED | OUTPATIENT
Start: 2017-05-25 | End: 2017-05-25 | Stop reason: HOSPADM

## 2017-05-24 RX ORDER — ALPRAZOLAM 0.5 MG/1
1 TABLET ORAL
Status: DISCONTINUED | OUTPATIENT
Start: 2017-05-24 | End: 2017-05-25 | Stop reason: HOSPADM

## 2017-05-24 RX ORDER — CELECOXIB 200 MG/1
200 CAPSULE ORAL ONCE
Status: COMPLETED | OUTPATIENT
Start: 2017-05-24 | End: 2017-05-24

## 2017-05-24 RX ORDER — SODIUM CHLORIDE 0.9 % (FLUSH) 0.9 %
5-10 SYRINGE (ML) INJECTION AS NEEDED
Status: DISCONTINUED | OUTPATIENT
Start: 2017-05-24 | End: 2017-05-25 | Stop reason: HOSPADM

## 2017-05-24 RX ORDER — DIPHENHYDRAMINE HCL 25 MG
25 CAPSULE ORAL
Status: DISCONTINUED | OUTPATIENT
Start: 2017-05-24 | End: 2017-05-25 | Stop reason: HOSPADM

## 2017-05-24 RX ORDER — SODIUM CHLORIDE 9 MG/ML
100 INJECTION, SOLUTION INTRAVENOUS CONTINUOUS
Status: DISCONTINUED | OUTPATIENT
Start: 2017-05-24 | End: 2017-05-25 | Stop reason: HOSPADM

## 2017-05-24 RX ORDER — BUPIVACAINE HYDROCHLORIDE 7.5 MG/ML
INJECTION, SOLUTION INTRASPINAL AS NEEDED
Status: DISCONTINUED | OUTPATIENT
Start: 2017-05-24 | End: 2017-05-24 | Stop reason: HOSPADM

## 2017-05-24 RX ORDER — ENALAPRIL MALEATE 10 MG/1
10 TABLET ORAL DAILY
Status: DISCONTINUED | OUTPATIENT
Start: 2017-05-25 | End: 2017-05-25 | Stop reason: HOSPADM

## 2017-05-24 RX ORDER — ACETAMINOPHEN 10 MG/ML
1000 INJECTION, SOLUTION INTRAVENOUS ONCE
Status: COMPLETED | OUTPATIENT
Start: 2017-05-24 | End: 2017-05-24

## 2017-05-24 RX ORDER — MIDAZOLAM HYDROCHLORIDE 1 MG/ML
INJECTION, SOLUTION INTRAMUSCULAR; INTRAVENOUS AS NEEDED
Status: DISCONTINUED | OUTPATIENT
Start: 2017-05-24 | End: 2017-05-24 | Stop reason: HOSPADM

## 2017-05-24 RX ORDER — FENTANYL CITRATE 50 UG/ML
100 INJECTION, SOLUTION INTRAMUSCULAR; INTRAVENOUS ONCE
Status: COMPLETED | OUTPATIENT
Start: 2017-05-24 | End: 2017-05-24

## 2017-05-24 RX ORDER — FENTANYL CITRATE 50 UG/ML
INJECTION, SOLUTION INTRAMUSCULAR; INTRAVENOUS AS NEEDED
Status: DISCONTINUED | OUTPATIENT
Start: 2017-05-24 | End: 2017-05-24 | Stop reason: HOSPADM

## 2017-05-24 RX ORDER — ASPIRIN 325 MG
325 TABLET, DELAYED RELEASE (ENTERIC COATED) ORAL EVERY 12 HOURS
Status: DISCONTINUED | OUTPATIENT
Start: 2017-05-24 | End: 2017-05-25 | Stop reason: HOSPADM

## 2017-05-24 RX ORDER — DEXAMETHASONE SODIUM PHOSPHATE 4 MG/ML
INJECTION, SOLUTION INTRA-ARTICULAR; INTRALESIONAL; INTRAMUSCULAR; INTRAVENOUS; SOFT TISSUE AS NEEDED
Status: DISCONTINUED | OUTPATIENT
Start: 2017-05-24 | End: 2017-05-24 | Stop reason: HOSPADM

## 2017-05-24 RX ADMIN — CEFAZOLIN 2 G: 1 INJECTION, POWDER, FOR SOLUTION INTRAMUSCULAR; INTRAVENOUS; PARENTERAL at 19:50

## 2017-05-24 RX ADMIN — PROPOFOL 25 MCG/KG/MIN: 10 INJECTION, EMULSION INTRAVENOUS at 12:50

## 2017-05-24 RX ADMIN — FENTANYL CITRATE 50 MCG: 50 INJECTION, SOLUTION INTRAMUSCULAR; INTRAVENOUS at 12:39

## 2017-05-24 RX ADMIN — BUPIVACAINE HYDROCHLORIDE 1.8 ML: 7.5 INJECTION, SOLUTION INTRASPINAL at 12:41

## 2017-05-24 RX ADMIN — CELECOXIB 200 MG: 200 CAPSULE ORAL at 10:00

## 2017-05-24 RX ADMIN — PROMETHAZINE HYDROCHLORIDE 12.5 MG: 25 INJECTION INTRAMUSCULAR; INTRAVENOUS at 21:52

## 2017-05-24 RX ADMIN — DEXAMETHASONE SODIUM PHOSPHATE 10 MG: 4 INJECTION, SOLUTION INTRA-ARTICULAR; INTRALESIONAL; INTRAMUSCULAR; INTRAVENOUS; SOFT TISSUE at 12:52

## 2017-05-24 RX ADMIN — TRANEXAMIC ACID 1000 MG: 100 INJECTION, SOLUTION INTRAVENOUS at 12:45

## 2017-05-24 RX ADMIN — MIDAZOLAM HYDROCHLORIDE 2 MG: 1 INJECTION, SOLUTION INTRAMUSCULAR; INTRAVENOUS at 12:35

## 2017-05-24 RX ADMIN — FENTANYL CITRATE 50 MCG: 50 INJECTION, SOLUTION INTRAMUSCULAR; INTRAVENOUS at 12:42

## 2017-05-24 RX ADMIN — PROPOFOL: 10 INJECTION, EMULSION INTRAVENOUS at 13:37

## 2017-05-24 RX ADMIN — TUBERCULIN PURIFIED PROTEIN DERIVATIVE 5 UNITS: 5 INJECTION, SOLUTION INTRADERMAL at 10:00

## 2017-05-24 RX ADMIN — MIDAZOLAM HYDROCHLORIDE 2 MG: 1 INJECTION, SOLUTION INTRAMUSCULAR; INTRAVENOUS at 12:00

## 2017-05-24 RX ADMIN — CEFAZOLIN 2 G: 1 INJECTION, POWDER, FOR SOLUTION INTRAMUSCULAR; INTRAVENOUS; PARENTERAL at 12:35

## 2017-05-24 RX ADMIN — FENTANYL CITRATE 100 MCG: 50 INJECTION, SOLUTION INTRAMUSCULAR; INTRAVENOUS at 12:00

## 2017-05-24 RX ADMIN — ACETAMINOPHEN 1000 MG: 10 INJECTION, SOLUTION INTRAVENOUS at 17:25

## 2017-05-24 RX ADMIN — SODIUM CHLORIDE, SODIUM LACTATE, POTASSIUM CHLORIDE, AND CALCIUM CHLORIDE 100 ML/HR: 600; 310; 30; 20 INJECTION, SOLUTION INTRAVENOUS at 10:00

## 2017-05-24 RX ADMIN — ONDANSETRON 4 MG: 2 INJECTION INTRAMUSCULAR; INTRAVENOUS at 12:54

## 2017-05-24 RX ADMIN — SODIUM CHLORIDE, SODIUM LACTATE, POTASSIUM CHLORIDE, AND CALCIUM CHLORIDE: 600; 310; 30; 20 INJECTION, SOLUTION INTRAVENOUS at 12:32

## 2017-05-24 RX ADMIN — LIDOCAINE HYDROCHLORIDE 0.1 ML: 10 INJECTION, SOLUTION INFILTRATION; PERINEURAL at 10:00

## 2017-05-24 RX ADMIN — SODIUM CHLORIDE, SODIUM LACTATE, POTASSIUM CHLORIDE, AND CALCIUM CHLORIDE: 600; 310; 30; 20 INJECTION, SOLUTION INTRAVENOUS at 12:55

## 2017-05-24 RX ADMIN — ONDANSETRON 4 MG: 2 INJECTION INTRAMUSCULAR; INTRAVENOUS at 19:50

## 2017-05-24 NOTE — PROGRESS NOTES
THERAPY    S: Wife states that pt. Has been very sleepy since he returned from surgery. O: B LEs still pharmacologically paralyzed. Only able to fire the hip external rotators. No volitional movement at knees or ankles in either leg. Very lethargic. States he feels hot and would like some water. Placed a cold rag on his forehead and filled his water pitcher. Adjusted his thermostat. A: Unsafe to attempt any mobility at this point.     P: Will progress with evals and [employer] program tomorrow am.    Kaelyn Degree, PT  Sweta Hernandez

## 2017-05-24 NOTE — ANESTHESIA PROCEDURE NOTES
Spinal Block    Start time: 5/24/2017 12:40 PM  End time: 5/24/2017 12:41 PM  Performed by: Myron Zaman  Authorized by: Myron Zaman     Pre-procedure:   Indications: primary anesthetic  Preanesthetic Checklist: patient identified, risks and benefits discussed, anesthesia consent, patient being monitored and timeout performed    Timeout Time: 12:39          Spinal Block:   Patient Position:  Seated  Prep Region:  Lumbar  Prep: chlorhexidine and patient draped      Location:  L3-4  Technique:  Single shot  Local:  Lidocaine 1%  Local Dose (mL):  2    Needle:   Needle Type:  Pencan  Needle Gauge:  25 G  Attempts:  1      Events: CSF confirmed, no blood with aspiration and no paresthesia        Assessment:  Insertion:  Uncomplicated  Patient tolerance:  Patient tolerated the procedure well with no immediate complications

## 2017-05-24 NOTE — PROGRESS NOTES
[address]  Face to Face Encounter    Patients Name: Martha Browne    YOB: 1965    Ordering Physician: Bull Pimentel    Primary Diagnosis: Primary osteoarthritis of one knee, right [M17.11]  S/p right TKA    Date of Face to Face:   5/24/2017                                  Face to Face Encounter findings are related to primary reason for home care:   yes. 1. I certify that the patient needs intermittent care as follows: physical therapy: gait/stair training    2. I certify that this patient is homebound, that is: 1) patient requires the use of a walker device, special transportation, or assistance of another to leave the home; or 2) patient's condition makes leaving the home medically contraindicated; and 3) patient has a normal inability to leave the home and leaving the home requires considerable and taxing effort. Patient may leave the home for infrequent and short duration for medical reasons, and occasional absences for non-medical reasons. Homebound status is due to the following functional limitations: Patient's ambulation limited secondary to severe pain and requires the use of an assistive device and the assistance of a caregiver for safe completion. Patient with strength and ROM deficits limiting ambulation endurance requiring the use of an assistive device and the assistance of a caregiver. Patient deemed temporarily homebound secondary to increased risk for infection when leaving home and going out into the community. 3. I certify that this patient is under my care and that I, or a nurse practitioner or  [id number], or clinical nurse specialist, or certified nurse midwife, working with me, had a Face-to-Face Encounter that meets the physician Face-to-Face Encounter requirements.   The following are the clinical findings from the [address] [place] [address] encounter that support the need for skilled services and is a summary of the encounter: see hospital chart        Kar Raya Corinna Kocher, [address]  5/24/2017      THE FOLLOWING TO BE COMPLETED BY THE COMMUNITY PHYSICIAN:    I concur with the findings described above from the F2F encounter that this patient is homebound and in need of a skilled service.     Certifying Physician: _____________________________________      Printed Certifying Physician Name: _____________________________________    Date: _________________

## 2017-05-24 NOTE — PERIOP NOTES
TRANSFER - IN REPORT:    Verbal report received from Neelima Chacon 45 on Elex Garre  being received from ortho for routine progression of care      Report consisted of patients Situation, Background, Assessment and   Recommendations(SBAR). Information from the following report(s) SBAR was reviewed with the receiving nurse. Opportunity for questions and clarification was provided. Assessment completed upon patients arrival to unit and care assumed.

## 2017-05-24 NOTE — PERIOP NOTES
Betadine lavage:  17.5cc of betadine lot # [id number] , exp. Date  01/2019,  in 500cc of . 9NS Lot # [id number] , exp.  Date : 03/2019

## 2017-05-24 NOTE — PERIOP NOTES
TRANSFER - OUT REPORT:    Verbal report given to Ian Johnson RN on Bro Rust  being transferred to [facility] for routine progression of care       Report consisted of patients Situation, Background, Assessment and   Recommendations(SBAR). Information from the following report(s) SBAR, MAR and Recent Results was reviewed with the receiving nurse. Lines:   Peripheral IV 05/24/17 Left Arm (Active)   Site Assessment Clean, dry, & intact 5/24/2017  9:58 AM   Phlebitis Assessment 0 5/24/2017  9:58 AM   Dressing Status Clean, dry, & intact 5/24/2017  9:58 AM   Dressing Type Transparent;Tape 5/24/2017  9:58 AM   Hub Color/Line Status Green; Infusing 5/24/2017  9:58 AM   Action Taken Blood drawn 5/24/2017  9:58 AM        Opportunity for questions and clarification was provided.       Patient transported with:   [employer]

## 2017-05-24 NOTE — ANESTHESIA PREPROCEDURE EVALUATION
Anesthetic History   No history of anesthetic complications            Review of Systems / Medical History  Patient summary reviewed and pertinent labs reviewed    Pulmonary  Within defined limits                 Neuro/Psych   Within defined limits           Cardiovascular    Hypertension: well controlled        Dysrhythmias (one episode per patient) : atrial fibrillation  Past MI (2001) and CAD    Exercise tolerance: >4 METS  Comments: Normal LVEF by echo 2015   GI/Hepatic/Renal  Within defined limits              Endo/Other      Hypothyroidism: well controlled  Obesity and arthritis     Other Findings              Physical Exam    Airway  Mallampati: II  TM Distance: > 6 cm  Neck ROM: normal range of motion   Mouth opening: Normal     Cardiovascular    Rhythm: regular           Dental    Dentition: Implants     Pulmonary                 Abdominal  GI exam deferred       Other Findings            Anesthetic Plan    ASA: 3  Anesthesia type: spinal      Post-op pain plan if not by surgeon: peripheral nerve block single    Induction: Intravenous  Anesthetic plan and risks discussed with: Patient

## 2017-05-24 NOTE — PROGRESS NOTES
TRANSFER - IN REPORT:    Verbal report received from Barbara Manning RN on Isaak Rosenberg  being received from PACU for routine post - op      Report consisted of patients Situation, Background, Assessment and   Recommendations(SBAR). Information from the following report(s) SBAR, Intake/Output and MAR was reviewed with the receiving nurse. Opportunity for questions and clarification was provided. Assessment completed upon patients arrival to unit and care assumed. Pt drowsy. Family in room. Oriented to room, bed controls, and how to order meals. No complaints. Aquacel dry and intact to R knee with iceman. TEDs and SCDs to LEs. Yellow gripper socks to feet and instructed not to get up without staff to assist. Has IS.

## 2017-05-24 NOTE — IP AVS SNAPSHOT
[address] 
[phone] Patient: Soheila Garcia MRN: [id number] :1965 You are allergic to the following Allergen Reactions Bactrim (Sulfamethoprim Ds) Rash Immunizations Administered for This Admission Name Date  
 TB Skin Test (PPD) Intradermal 2017 Recent Documentation Height Weight BMI Smoking Status 1.753 m 111.1 kg 36.18 kg/m2 Never Smoker Emergency Contacts Name Discharge Info Relation Home Work Mobile Sandra Garcia CAREGIVER [3]    [phone] About your hospitalization You were admitted on:  May 24, 2017 You last received care in the:  [facility]. [place] [address] You were discharged on:  May 25, 2017 Unit phone number:  [phone] Why you were hospitalized Your primary diagnosis was:  Status Post Total Right Knee Replacement Providers Seen During Your Hospitalizations Provider Role Specialty Primary office phone Todd Chavez MD Attending Provider Orthopedic Surgery [phone] Your Primary Care Physician (PCP) Primary Care Physician Office Phone Office Fax YESENIA Reynolds [phone] Follow-up Information Follow up With Details Comments Contact Info Shadia Ny MD  As needed [address] [facility] [address] 
[phone] Todd Chavez MD  As scheduled by office [place] [address] [facility], [place], [facility] [address] [phone] [facility]  Will contact you within 48 hrs [phone] Current Discharge Medication List  
  
START taking these medications Dose & Instructions Dispensing Information Comments Morning Noon Evening Bedtime  
 traMADol 50 mg tablet Commonly known as:  ULTRAM  
Your next dose is: Today  Dose:  50 mg  
 Take 1 Tab by mouth every six (6) hours as needed. Max Daily Amount: 200 mg. Quantity:  40 Tab Refills:  0 CONTINUE these medications which have CHANGED Dose & Instructions Dispensing Information Comments Morning Noon Evening Bedtime  
 aspirin delayed-release 325 mg tablet What changed:   
- medication strength 
- how much to take - when to take this 
- additional instructions Your next dose is: Today Dose:  325 mg Take 1 Tab by mouth every twelve (12) hours every twelve (12) hours for 35 days. Quantity:  70 Tab Refills:  0 This medication is to prevent blood clots for 5 weeks after surgery. CONTINUE these medications which have NOT CHANGED Dose & Instructions Dispensing Information Comments Morning Noon Evening Bedtime  
 enalapril 10 mg tablet Commonly known as:  [employer] Your next dose is:  Tomorrow Dose:  10 mg Take 10 mg by mouth every morning. Refills:  0 LIPITOR 20 mg tablet Generic drug:  atorvastatin Your next dose is:  Tomorrow Dose:  20 mg Take 20 mg by mouth daily. Take DOS per anesthesia protocol Refills:  0  
     
  
   
   
   
  
 multivitamin tablet Commonly known as:  ONE A DAY Your next dose is:  Tomorrow Dose:  1 Tab Take 1 Tab by mouth daily. Last dose 6/8/16 Refills:  0  
     
  
   
   
   
  
 SYNTHROID 100 mcg tablet Generic drug:  levothyroxine Your next dose is:  Tomorrow Dose:  100 mcg Take 100 mcg by mouth Daily (before breakfast). Take DOS per anesthesia protocol. Refills:  0  
     
  
   
   
   
  
 tadalafil 5 mg tablet Commonly known as:  CIALIS Your next dose is:  Tomorrow Dose:  5 mg Take 5 mg by mouth daily as needed. Refills:  0  
     
  
   
   
   
  
 TESTOSTERONE IM Your next dose is:  05/31/2017  
   
 by IntraMUSCular route every Wednesday. Refills:  0 TYLENOL 325 mg tablet Generic drug:  acetaminophen Your next dose is: Today Take  by mouth every four (4) hours as needed for Pain. Take DOS if needed per anesthesia protocol. Refills:  0  
     
   
   
  
   
  
 XANAX 1 mg tablet Generic drug:  ALPRAZolam  
Your next dose is: Today Dose:  1 mg Take 1 mg by mouth nightly as needed for Anxiety. Refills:  0 STOP taking these medications   
 ibuprofen 200 mg tablet Commonly known as:  MOTRIN Where to Get Your Medications Information on where to get these meds will be given to you by the nurse or doctor. ! Ask your nurse or doctor about these medications  
  aspirin delayed-release 325 mg tablet  
 traMADol 50 mg tablet Discharge Instructions [facility] [employer] Patient Discharge Instructions Ryan Quiñones / [id number] : 1965 Admitted 2017 Discharged: 2017 IF YOU HAVE ANY PROBLEMS ONCE YOU ARE AT HOME CALL THE FOLLOWING NUMBERS:  
Main office number: [phone] Medications · The medications you are to continue on are listed on the medication reconciliation sheet. · Narcotic pain medications as well as supplemental iron can cause constipation. If this occurs try stopping the narcotic pain medication and/or the iron. · It is important that you take the medication exactly as they are prescribed. · Medications which increase your risk of blood clots are listed to stop for 5 weeks after surgery as well as medications or supplements which increase your risk of bleeding complications. · Keep your medication in the bottles provided by the pharmacist and keep a list of the medication names, dosages, and times to be taken in your wallet. · Do not take other medications without consulting your doctor. Important Information Do NOT smoke as this will greatly increase your risk of infection! Resume your prehospital diet. If you have excessive nausea or vomitting call your doctor's office Leg swelling and warmth is normal for 6 months after surgery. If you experience swelling in your leg elevate you leg while laying down with your toes above your heart. If you have sudden onset severe swelling with leg pain call our office. Use Nick Hose stockings until we see you in the office for your follow up appointment. The stitches deep inside take approximately 6 months to dissolve. There will be sharp shooting, stinging and burning pain. This is normal and will resolve between 3-6 months after surgery. Difficulty sleeping is normal following total Knee and Hip replacement. You may try melatonin, an over-the-counter sleep aid or benadryl to help with sleep. Most patients will resume sleeping through the night 8 weeks after surgery. Home Physical Therapy is arranged. Home Health will contact you within 48 hrs of discharge that you have chosen. If you have not received a call within this time frame please contact that provider you chose. You should be given this information before you leave the hospital.  
 
You are at a risk for falls. Use the rolling walker when walking. Patients who have had a joint replacement should not drive if they are still taking narcotic pain mediation during the daytime hours. Most patients wean themselves off of pain medication within 2-5 weeks after surgery. When to Call the office - If you have a temperature greater then 101 
- Uncontrolled vomiting - Loose control of your bladder or bowel function - Are unable to bear any weight  
- Need a pain medication refill DISCHARGE SUMMARY from Nurse The following personal items collected during your admission are returned to you:  
Dental Appliance: Dental Appliances: None Vision: Visual Aid: Glasses Hearing Aid:   na 
 Jewelry: Jewelry: None Clothing:   self Other Valuables: Other Valuables: Cell Phone, KAY (Ipad and all valuables with Shima) Valuables sent to safe:   na 
 
PATIENT INSTRUCTIONS: 
 
After general anesthesia or intravenous sedation, for 24 hours or while taking prescription Narcotics: · Limit your activities · Do not drive and operate hazardous machinery · Do not make important personal or business decisions · Do  not drink alcoholic beverages · If you have not urinated within 8 hours after discharge, please contact your surgeon on call. Report the following to your surgeon: 
· Excessive pain, swelling, redness or odor of or around the surgical area · Temperature over 101 · Nausea and vomiting lasting longer than 4 hours or if unable to take medications · Any signs of decreased circulation or nerve impairment to extremity: change in color, persistent  numbness, tingling, coldness or increase pain · Any questions, call office @ [phone] Keep scheduled follow up appointment. If need to change, call office @ [phone]. *  Please give a list of your current medications to your Primary Care Provider. *  Please update this list whenever your medications are discontinued, doses are 
    changed, or new medications (including over-the-counter products) are added. *  Please carry medication information at all times in case of emergency situations. Total Knee Replacement: What to Expect at Home Your Recovery When you leave the hospital, you should be able to move around with a walker or crutches. But you will need someone to help you at home for the next few weeks or until you have more energy and can move around better. If there is no one to help you at home, you may go to a rehabilitation center. You will go home with a bandage and stitches or staples. Change the bandage as your doctor tells you to.  Your doctor will remove your stitches or staples 10 to 21 days after your surgery. You may still have some mild pain, and the area may be swollen for 3 to 6 months after surgery. Your knee will continue to improve for 6 to 12 months. You will probably use a walker for 1 to 3 weeks and then use crutches. When you are ready, you can use a cane. You will probably be able to walk on your own in 4 to 8 weeks. You will need to do months of physical rehabilitation (rehab) after a knee replacement. Rehab will help you strengthen the muscles of the knee and help you regain movement. After you recover, your artificial knee will allow you to do normal daily activities with less pain or no pain at all. You may be able to hike, dance, ride a bike, and play golf. Talk to your doctor about whether you can do more strenuous activities. Always tell your caregivers that you have an artificial knee. How long it will take to walk on your own, return to normal activities, and go back to work depends on your health and how well your rehabilitation (rehab) program goes. The better you do with your rehab exercises, the quicker you will get your strength and movement back. This care sheet gives you a general idea about how long it will take for you to recover. But each person recovers at a different pace. Follow the steps below to get better as quickly as possible. How can you care for yourself at home? Activity · Rest when you feel tired. You may take a nap, but do not stay in bed all day. When you sit, use a chair with arms. You can use the arms to help you stand up. · Work with your physical therapist to find the best way to exercise. You may be able to take frequent, short walks using crutches or a walker. What you can do as your knee heals will depend on whether your new knee is cemented or uncemented. You may not be able to do certain things for a while if your new knee is uncemented.  
· After your knee has healed enough, you can do more strenuous activities with caution. ¨ You can golf, but use a golf cart, and do not wear shoes with spikes. ¨ You can bike on a flat road or on a stationary bike. Avoid biking up hills. ¨ Your doctor may suggest that you stay away from activities that put stress on your knee. These include tennis or badminton, squash or racquetball, contact sports like football, jumping (such as in basketball), jogging, or running. ¨ Avoid activities where you might fall. These include horseback riding, skiing, and mountain biking. · Do not sit for more than 1 hour at a time. Get up and walk around for a while before you sit again. If you must sit for a long time, prop up your leg with a chair or footstool. This will help you avoid swelling. · Ask your doctor when you can shower. You may need to take sponge baths until your stitches or staples have been removed. · Ask your doctor when you can drive again. It may take up to 8 weeks after knee replacement surgery before it is safe for you to drive. · When you get into a car, sit on the edge of the seat. Then pull in your legs, and turn to face the front. · You should be able to do many everyday activities 3 to 6 weeks after your surgery. You will probably need to take 4 to 16 weeks off from work. When you can go back to work depends on the type of work you do and how you feel. · Ask your doctor when it is okay for you to have sex. · Do not lift anything heavier than 10 pounds and do not lift weights for 12 weeks. Diet · By the time you leave the hospital, you should be eating your normal diet. If your stomach is upset, try bland, low-fat foods like plain rice, broiled chicken, toast, and yogurt. Your doctor may suggest that you take iron and vitamin supplements. · Drink plenty of fluids (unless your doctor tells you not to). · Eat healthy foods, and watch your portion sizes. Try to stay at your ideal weight. Too much weight puts more stress on your new knee. · You may notice that your bowel movements are not regular right after your surgery. This is common. Try to avoid constipation and straining with bowel movements. You may want to take a fiber supplement every day. If you have not had a bowel movement after a couple of days, ask your doctor about taking a mild laxative. Medicines · Your doctor will tell you if and when you can restart your medicines. He or she will also give you instructions about taking any new medicines. · If you take blood thinners, such as warfarin (Coumadin), clopidogrel (Plavix), or aspirin, be sure to talk to your doctor. He or she will tell you if and when to start taking those medicines again. Make sure that you understand exactly what your doctor wants you to do. · Your doctor may give you a blood-thinning medicine to prevent blood clots. If you take a blood thinner, be sure you get instructions about how to take your medicine safely. Blood thinners can cause serious bleeding problems. This medicine could be in pill form or as a shot (injection). If a shot is necessary, your doctor will tell you how to do this. · Be safe with medicines. Take pain medicines exactly as directed. ¨ If the doctor gave you a prescription medicine for pain, take it as prescribed. ¨ If you are not taking a prescription pain medicine, ask your doctor if you can take an over-the-counter medicine. ¨ Plan to take your pain medicine 30 minutes before exercises. It is easier to prevent pain before it starts than to stop it once it has started. · If you think your pain medicine is making you sick to your stomach: 
¨ Take your medicine after meals (unless your doctor has told you not to). ¨ Ask your doctor for a different pain medicine. · If your doctor prescribed antibiotics, take them as directed. Do not stop taking them just because you feel better. You need to take the full course of antibiotics. Incision care · You will have a bandage over the cut (incision) and staples or stitches. Take the bandage off when your doctor says it is okay. · Your doctor will remove the staples or stitches 10 days to 3 weeks after the surgery and replace them with strips of tape. Leave the tape on for a week or until it falls off. Exercise · Your rehab program will give you a number of exercises to do to help you get back your knee's range of motion and strength. Always do them as your therapist tells you. Ice and elevation · For pain and swelling, put ice or a cold pack on the area for 10 to 20 minutes at a time. Put a thin cloth between the ice and your skin. Other instructions · Continue to wear your support stockings as your doctor says. These help to prevent blood clots. The length of time that you will have to wear them depends on your activity level and the amount of swelling. · Wear medical alert jewelry that says you may need antibiotics before any procedure, including dental work. You can buy this at most [employer]. · You have metal pieces in your knee. These may set off some airport metal detectors. Carry a medical alert card that says you have an artificial joint, just in case. Follow-up care is a key part of your treatment and safety. Be sure to make and go to all appointments, and call your doctor if you are having problems. It's also a good idea to know your test results and keep a list of the medicines you take. When should you call for help? Call 911 anytime you think you may need emergency care. For example, call if: 
· You passed out (lost consciousness). · You have severe trouble breathing. · You have sudden chest pain and shortness of breath, or you cough up blood. Call your doctor now or seek immediate medical care if: 
· You have signs of infection, such as: 
¨ Increased pain, swelling, warmth, or redness. ¨ Red streaks leading from the incision. ¨ Pus draining from the incision. ¨ A fever. · You have signs of a blood clot, such as: 
¨ Pain in your calf, back of the knee, thigh, or groin. ¨ Redness and swelling in your leg or groin. · Your incision comes open and begins to bleed, or the bleeding increases. · You have pain that does not get better after you take pain medicine. Watch closely for changes in your health, and be sure to contact your doctor if: 
· You do not have a bowel movement after taking a laxative. Where can you learn more? Go to http://bernard-ghada.info/. Enter I609 in the search box to learn more about \"Total Knee Replacement: What to Expect at Home. \" Current as of: August 4, 2016 Content Version: 11.2 © 1310-2610 [employer]. Care instructions adapted under license by [employer]Â® (which disclaims liability or warranty for this information). If you have questions about a medical condition or this instruction, always ask your healthcare professional. [place] [address] any warranty or liability for your use of this information. These are general instructions for a healthy lifestyle: No smoking/ No tobacco products/ Avoid exposure to second hand smoke Surgeon General's Warning:  Quitting smoking now greatly reduces serious risk to your health. Obesity, smoking, and sedentary lifestyle greatly increases your risk for illness A healthy diet, regular physical exercise & weight monitoring are important for maintaining a healthy lifestyle You may be retaining fluid if you have a history of heart failure or if you experience any of the following symptoms:  Weight gain of 3 pounds or more overnight or 5 pounds in a week, increased swelling in our hands or feet or shortness of breath while lying flat in bed. Please call your doctor as soon as you notice any of these symptoms; do not wait until your next office visit. Recognize signs and symptoms of STROKE: 
 
F-face looks uneven A-arms unable to move or move even S-speech slurred or non-existent T-time-call 911 as soon as signs and symptoms begin-DO NOT go Back to bed or wait to see if you get better-TIME IS BRAIN. The discharge information has been reviewed with the patient. The patient verbalized understanding. Information obtained by : 
I understand that if any problems occur once I am at home I am to contact my physician. I understand and acknowledge receipt of the instructions indicated above. Physician's or R.N.'s Signature                                                                  Date/Time Patient or Representative Signature                                                          Date/Time Discharge Orders Procedure Order Date Status Priority Quantity Spec Type Associated Dx CALL YOUR DOCTOR For: Temperature greater than 100.4., Severe uncontrolled pain. , Persistant nausea and vomiting., Persistant dizziness or light-headedness. , Hives, Difficulty breathing, headache, or visual disturbances. , Redness, tenderness, or s. .. 05/25/17 0740 Normal Routine 1  Status post total right knee replacement [[id number]] Questions: For:  Temperature greater than 100.4. For:  Severe uncontrolled pain. For:  Persistant nausea and vomiting. For:  Persistant dizziness or light-headedness. For:  Adriana Aver For:  Difficulty breathing, headache, or visual disturbances. For:  Redness, tenderness, or signs of infection. ACTIVITY AFTER DISCHARGE Patient should: Restrict driving, Restrict lifting, Other (specify) 05/25/17 1440 Normal Routine 1  Status post total right knee replacement [[id number]] Questions: Patient should:  Restrict driving Patient should:  Restrict lifting Patient should: Other (specify) DRESSING, CHANGE SPECIFY 05/25/17 0740 Normal Routine 1  Status post total right knee replacement [[id number]] Comments:  Routine dressing changes. Notify if excessive drainage. If staples are present they are to be removed 10 days post surgery and steri strips applied. REFERRAL TO HOME HEALTH 05/25/17 0740 Normal Routine 1  Status post total right knee replacement [[id number]] REFERRAL TO PHYSICAL THERAPY 05/25/17 0740 Normal Routine 1  Status post total right knee replacement [[id number]] Comments:  Referral to Home PT Introducing [facility]! [employer] introduces [employer] patient portal. Now you can access parts of your medical record, email your doctor's office, and request medication refills online. 1. In your internet browser, go to [email or web address] 2. Click on the First Time User? Click Here link in the Sign In box. You will see the New Member Sign Up page. 3. Enter your [employer] Access Code exactly as it appears below. You will not need to use this code after youve completed the sign-up process. If you do not sign up before the expiration date, you must request a new code. · [employer] Access Code: [email or web address] Expires: 7/24/2017  1:13 PM 
 
4. Enter the last four digits of your Social Security Number (xxxx) and Date of Birth (mm/dd/yyyy) as indicated and click Submit. You will be taken to the next sign-up page. 5. Create a [employer] ID. This will be your [employer] login ID and cannot be changed, so think of one that is secure and easy to remember. 6. Create a [employer] password. You can change your password at any time. 7. Enter your Password Reset Question and Answer. This can be used at a later time if you forget your password. 8. Enter your e-mail address.  You will receive e-mail notification when new information is available in EdgeSpringt. 9. Click Sign Up. You can now view and download portions of your medical record. 10. Click the Download Summary menu link to download a portable copy of your medical information. If you have questions, please visit the Frequently Asked Questions section of the NotesFirst website. Remember, [employer]First is NOT to be used for urgent needs. For medical emergencies, dial 911. Now available from your iPhone and Android! General Information Please provide this summary of care documentation to your next provider. Patient Signature:  ____________________________________________________________ Date:  ____________________________________________________________  
  
Nasir Mita Provider Signature:  ____________________________________________________________ Date:  ____________________________________________________________

## 2017-05-24 NOTE — ANESTHESIA POSTPROCEDURE EVALUATION
Post-Anesthesia Evaluation and Assessment    Patient: Barbara Guadarrama MRN: [id number]  SSN: [id number]    YOB: 1965  Age: 47 y.o. Sex: male       Cardiovascular Function/Vital Signs  Visit Vitals    /62    Pulse 77    Temp 36.3 °C (97.4 °F)    Resp 16    Ht 5' 9\" (1.753 m)    Wt 111.1 kg (245 lb)    SpO2 98%    BMI 36.18 kg/m2       Patient is status post spinal anesthesia for Procedure(s):  RIGHT KNEE ARTHROPLASTY TOTAL. Nausea/Vomiting: None    Postoperative hydration reviewed and adequate. Pain:  Pain Scale 1: Numeric (0 - 10) (05/24/17 1508)  Pain Intensity 1: 0 (05/24/17 1508)   Managed    Neurological Status:   Neuro (WDL): Exceptions to WDL (05/24/17 1508)  Neuro  Neurologic State: Drowsy (05/24/17 1508)  LLE Motor Response: Pharmacologically paralyzed;Numbness (05/24/17 1508)  RLE Motor Response: Pharmacologically paralyzed;Numbness (spinal and ACB prior to surgery) (05/24/17 1508)   At baseline    Mental Status and Level of Consciousness: Arousable    Pulmonary Status:   O2 Device: Nasal cannula (05/24/17 1523)   Adequate oxygenation and airway patent    Complications related to anesthesia: None    Post-anesthesia assessment completed.  No concerns    Signed By: Britany Quezada MD     May 24, 2017

## 2017-05-24 NOTE — OP NOTES
[facility]  Cementless Total Knee Arthroplasty: Posterior Cruciate Retaining     Patient:Hernando Blanco   : 1965  Medical Record [id number]  Pre-operative Diagnosis:  Primary osteoarthritis of one knee, right [M17.11]  Post-operative Diagnosis: Status post total right knee replacement  Location: [address] [place] [address]  Surgeon: Digna Haile MD   Assistant: Eileen Le PA-C    Anesthesia: Spinal and FNB    Procedure:Procedure(s) (LRB):  RIGHT KNEE ARTHROPLASTY TOTAL (Right)   The complexity of the total joint surgery requires the use of a first assistant for positioning, retraction and expertise in closure. Tourniquet Time: 0 minutes  EBL: 250 cc  Findings: severe degenerative arthritis, patellar osteophytes, posterior femoral osteophytes   BMI: Body mass index is 36.18 kg/(m^2). Martha Browne was brought to the operating room and positioned on the operating table. He was anesthestized with anesthesia. A shirley catheter was placed preoperatively and IV antibiotics was administered. Prior to the incision being made a timeout was called identifying the patient, procedure ,operative side and surgeon The operative leg was prepped and draped in the usual sterile manner. An anterior longitudinal incision was accomplished just medial to the tibial tubercle and extending approximal 6 centimeters proximal to the superior pole of the patella. A medial parapatellar capsular incision was performed. The medial capsular flap was elevated around to the insertion of the semimembranous tendon. The patella was everted and the knee flexed and externally rotated. The medial and external menisci were excised. The lateral half of the fat pad excised and the patella femoral ligament was released. The anterior cruciate ligament was resect and the posterior cruciate ligament was retained.   Using extramedullary instrumentation, the tibial cut was accomplished with appropriate posterior slope. The distal femur was addressed. A drill hole was made above the intracondylar notch. Using appropriate intramedullary instrumentation,a five degree valgus distal cut was accomplished. The femur was sized. The anterior and posterior cuts were then made about the distal femur. The osteophytes were removed from the tibial and femoral surfaces. The flexion and extension gaps were assessed with the appropriate spacer blocks. Additional surgical procedures included: none. The flexion and extension gaps were deemed appropriately balanced. The appropriate cutting blocks were then utilized to perform the anterior, posterior and chamfer cuts, with appropriate lateral translation accomplished for the patellofemoral groove. Approximately 9 mm of bone was removed from the high side of the tibia. The tibia was sized. .  The tibial base plate was pinned into place with the appropriate external rotation and stem site prepared. A preliminary range of motion was accomplished. The  Patient was found to obtain full extension as well as appropriate flexion. The patient's ligaments were stable in flexion and extension to medial and lateral stressing and the alignment was through the appropriate mechanical axis. The patella was then everted. The bone was resect to accommodate the three peg patella button. A trial reduction revealed appropriate tracking through the patellofemoral groove with no lateral retinacular release being accomplished. All trial components were removed. The real implants were opened: Sizes listed below. The knee was irrigated. There were no femoral deficiencies. There were no tibial deficiencies. No augmentation was utilized. The permanent cementless Tibial and Femoral components were impacted into place. The cementless  patella component was then pressed in place.     Queta Rosanachristal knee was placed through range of motion and noted to be stable as mentioned above with the trail components. The wound was dry, therefore no drain was used. The operative knee was injected with 60 cc of Naropin, 10 cc's of morphine and 1 cc of 30 mg of Toradol. The knee was then soaked with a diluted betadine solution for approximately 3 min. This was then thoroughly irrigated. The capsular layer was closed using a #1 PDS suture. Then, 1 gram (100 mg/ml) of Transexamic Acid was injected into the joint space. The subcutaneous layers were closed using 2-0 Stratafix. Finally the skin was closed using 3-0 Vicryl and skin staples, which were applied in occlusive fashion and sterile bandage applied. An Iceman cryo pad was applied on the operative leg. Sponge count and needle counts were correct. Bro Rust left the operating room     Implants:   Implant Name Type Inv.  Item Serial No.  Lot No. LRB No. Used   COMPNT FEM CR TRIATHLN 6 R PA --  - [id number]  COMPNT FEM CR TRIATHLN 6 R PA --  [id number] [employer] HOW [id number] Right 1   BASEPLT TIB PC TRITNM SZ 5 -- TRIATHLON - [id number]  BASEPLT TIB PC TRITNM SZ 5 -- TRIATHLON [id number] [employer] HOW [id number] Right 1   PAT ASYM MTL-BK 10MM SZ A35 -- TRIATHLON - [id number]  PAT ASYM MTL-BK 10MM SZ A35 -- TRIATHLON D1P4 [employer] HOW D1P4 Right 1   INSERT TIB ARTC BEAR SZ5 11MM --  - [id number]   INSERT TIB ARTC BEAR SZ5 11MM --  [id number] [facility] [employer] HOW [id number] Right 1         Signed By: Jing Greer MD   5/24/2017,  2:17 PM

## 2017-05-24 NOTE — ANESTHESIA PROCEDURE NOTES
Peripheral Block    Start time: 5/24/2017 12:01 PM  End time: 5/24/2017 12:02 PM  Performed by: Lex Hobbs  Authorized by: Lex Hobbs       Pre-procedure: Indications: at surgeon's request and post-op pain management    Preanesthetic Checklist: patient identified, risks and benefits discussed, site marked, timeout performed, anesthesia consent given and patient being monitored    Timeout Time: 12:00          Block Type:   Block Type:   Adductor canal  Laterality:  Right  Monitoring:  Standard ASA monitoring, continuous pulse ox, frequent vital sign checks, heart rate, responsive to questions and oxygen  Injection Technique:  Single shot  Procedures: ultrasound guided    Patient Position: supine  Prep: chlorhexidine    Location:  Mid thigh  Needle Type:  Stimuplex  Needle Gauge:  22 G  Needle Localization:  Ultrasound guidance  Medication Injected:  0.2%  ropivacaine  Adds:  Epi 1:200K  Volume (mL):  20    Assessment:  Number of attempts:  1  Injection Assessment:  Incremental injection every 5 mL, local visualized surrounding nerve on ultrasound, negative aspiration for CSF, negative aspiration for blood, no paresthesia, no intravascular symptoms and ultrasound image on chart  Patient tolerance:  Patient tolerated the procedure well with no immediate complications

## 2017-05-24 NOTE — PERIOP NOTES
TRANSFER - OUT REPORT:    Verbal report given to [facility], RN on Marixa Mott  being transferred to 3rd floor ortho for routine progression of care       Report consisted of patients Situation, Background, Assessment and   Recommendations(SBAR). Information from the following report(s) OR Summary, Procedure Summary, Intake/Output and MAR was reviewed with the receiving nurse. Lines:   Peripheral IV 05/24/17 Left Arm (Active)   Site Assessment Clean, dry, & intact 5/24/2017  3:08 PM   Phlebitis Assessment 0 5/24/2017  3:08 PM   Infiltration Assessment 0 5/24/2017  3:08 PM   Dressing Status Clean, dry, & intact 5/24/2017  3:08 PM   Dressing Type Transparent 5/24/2017  3:08 PM   Hub Color/Line Status Infusing 5/24/2017  3:08 PM   Action Taken Blood drawn 5/24/2017  9:58 AM        Opportunity for questions and clarification was provided.       Patient transported with:   O2 @ 2 liters

## 2017-05-24 NOTE — H&P
[address] [facility]  Pre Operative History and Physical Exam    Patient ID:  Antoni Browne  [id number]  40 y.o.  1965    Today: May 24, 2017       Assessment:   1. Arthritis of the right knee      Plan:    1. Proceed with scheduled Procedure(s) (LRB):  RIGHT KNEE ARTHROPLASTY TOTAL/KAYLA (Right)            CC:  Right knee pain    HPI:   The patient has end stage arthritis of the right knee. The patient was evaluated and examined during a consultation prior to this office visit. There have been no changes to the patient's orthopedic condition since the initial consultation. The patient has failed previous conservative treatment for this condition including antiinflammatories , and lifestyle modifications. The necessity for joint replacement is present. The patient will be admitted the day of surgery for Procedure(s) (LRB):  RIGHT KNEE ARTHROPLASTY TOTAL/KAYLA (Right)      Past Medical/Surgical History:  Past Medical History:   Diagnosis Date    Abnormal hemoglobin (Hgb) ([facility] [address] 05/02/2017    Hgb 17.7-patient takes weekly testosterone injection     Anxiety     managed with medication     Arrhythmia     [place] A fib dx 10/31/15 after binge drinking. IV Cardiazem given and converted to NSR. Patient took Cardiazem PO for 6 months and then discontinued. Now takes 81 mg ASA daily. Last EKG 5/2/17 NSR. No hx of cardioversion.  Arthritis     OA-bilateral knees     CAD (coronary artery disease)     mild MI per patient in 2001- no intervention/no blockages; then 10/31/15 \"I drank heavily and I went into a fib\"- went to the ED- started on Cardizem- stress test normal    Chronic pain     knees    H/O echocardiogram 11/02/2015    EF 55-60%, Trace mitral regurgitation, Mild tricuspid regurgitation, diastolic dysfunction grade II.  History of MI (myocardial infarction) 2001    Followed by Dr. Tom Kovacs at [facility].       Hypertension     controlled with med    Morbid obesity ([address] 06/03/2016    Nausea & vomiting     no problems with nausea after knee scope     Thyroid disease     hypo- on med     Past Surgical History:   Procedure Laterality Date    HX COLONOSCOPY      HX KNEE ARTHROSCOPY Left 06/2016    HX ORTHOPAEDIC Right     age 14- X 2 - ? meniscus/ cartilage    HX TONSILLECTOMY      age 23        Allergies: Allergies   Allergen Reactions    Bactrim [Sulfamethoprim Ds] Rash        Objective:                    HEENT: NC/AT                   Lungs:  Unlabored respirations, clear breath sounds                    Heart:   RRR                    Abdomen: soft                   Extremities:  Pain with ROM of the right knee    Meds:   Current Facility-Administered Medications   Medication Dose Route Frequency    ceFAZolin in 0.9% NS (ANCEF) IVPB soln 2 g  2 g IntraVENous ONCE    lidocaine (XYLOCAINE) 10 mg/mL (1 %) injection 0.1 mL  0.1 mL SubCUTAneous PRN    lactated ringers infusion  100 mL/hr IntraVENous CONTINUOUS    sodium chloride (NS) flush 5-10 mL  5-10 mL IntraVENous Q8H    sodium chloride (NS) flush 5-10 mL  5-10 mL IntraVENous PRN    fentaNYL citrate (PF) injection 100 mcg  100 mcg IntraVENous ONCE    midazolam (VERSED) injection 2 mg  2 mg IntraVENous ONCE PRN    midazolam (VERSED) injection 2 mg  2 mg IntraVENous ONCE         Labs:  Admission on 05/24/2017   Component Date Value Ref Range Status    Glucose (POC) 05/24/2017 105* 65 - 100 mg/dL Final    Comment: 47 - 60 mg/dl Consistent with, but not fully diagnostic of hypoglycemia.   101 - 125 mg/dl Impaired fasting glucose/consistent with pre-diabetes mellitus  > 126 mg/dl Fasting glucose consistent with overt diabetes mellitus     Hospital Outpatient Visit on 05/02/2017   Component Date Value Ref Range Status    WBC 05/02/2017 6.1  4.3 - 11.1 K/uL Final    RBC 05/02/2017 5.83* 4.23 - 5.67 M/uL Final    HGB 05/02/2017 17.7* 13.6 - 17.2 g/dL Final    HCT 05/02/2017 51.6* 41.1 - 50.3 % Final    MCV 05/02/2017 88.5  79.6 - 97.8 FL Final    MCH 05/02/2017 30.4  26.1 - 32.9 PG Final    MCHC 05/02/2017 34.3  31.4 - 35.0 g/dL Final    RDW 05/02/2017 13.6  11.9 - 14.6 % Final    PLATELET 53/23/1248 596  150 - 450 K/uL Final    MPV 05/02/2017 10.9  10.8 - 14.1 FL Final    DF 05/02/2017 AUTOMATED    Final    NEUTROPHILS 05/02/2017 51  43 - 78 % Final    LYMPHOCYTES 05/02/2017 36  13 - 44 % Final    MONOCYTES 05/02/2017 8  4.0 - 12.0 % Final    EOSINOPHILS 05/02/2017 3  0.5 - 7.8 % Final    BASOPHILS 05/02/2017 1  0.0 - 2.0 % Final    IMMATURE GRANULOCYTES 05/02/2017 0.7  0.0 - 5.0 % Final    ABS. NEUTROPHILS 05/02/2017 3.2  1.7 - 8.2 K/UL Final    ABS. LYMPHOCYTES 05/02/2017 2.2  0.5 - 4.6 K/UL Final    ABS. MONOCYTES 05/02/2017 0.5  0.1 - 1.3 K/UL Final    ABS. EOSINOPHILS 05/02/2017 0.2  0.0 - 0.8 K/UL Final    ABS. BASOPHILS 05/02/2017 0.0  0.0 - 0.2 K/UL Final    ABS. IMM. GRANS. 05/02/2017 0.0  0.0 - 0.5 K/UL Final    Prothrombin time 05/02/2017 10.6  9.6 - 12.0 sec Final    INR 05/02/2017 1.0  0.9 - 1.2   Final    Comment: Suggested therapeutic INR range:  Venous thrombosis and embolus  2.0-3.0  Prosthetic heart valve         2.5-3.5      aPTT 05/02/2017 26.5  23.5 - 31.7 SEC Final    Heparin Therapeutic Range = 56.6-81.7 secs    Sodium 05/02/2017 139  136 - 145 mmol/L Final    Potassium 05/02/2017 4.2  3.5 - 5.1 mmol/L Final    Chloride 05/02/2017 105  98 - 107 mmol/L Final    CO2 05/02/2017 28  21 - 32 mmol/L Final    Anion gap 05/02/2017 6* 7 - 16 mmol/L Final    Glucose 05/02/2017 158* 65 - 100 mg/dL Final    Comment: 47 - 60 mg/dl Consistent with, but not fully diagnostic of hypoglycemia.   101 - 125 mg/dl Impaired fasting glucose/consistent with pre-diabetes mellitus  > 126 mg/dl Fasting glucose consistent with overt diabetes mellitus      BUN 05/02/2017 13  6 - 23 MG/DL Final    Creatinine 05/02/2017 1.11  0.8 - 1.5 MG/DL Final    GFR est AA 05/02/2017 >60  >60 ml/min/1.73m2 Final    GFR est non-AA 05/02/2017 >60  >60 ml/min/1.73m2 Final    Comment: (NOTE)  Estimated GFR is calculated using the Modification of Diet in Renal   Disease (MDRD) Study equation, reported for both  Americans   (GFRAA) and non- Americans (GFRNA), and normalized to 1.73m2   body surface area. The physician must decide which value applies to   the patient. The MDRD study equation should only be used in   individuals age 25 or older. It has not been validated for the   following: pregnant women, patients with serious comorbid conditions,   or on certain medications, or persons with extremes of body size,   muscle mass, or nutritional status.  Calcium 05/02/2017 8.9  8.3 - 10.4 MG/DL Final    Color 05/02/2017 YELLOW    Final    Appearance 05/02/2017 CLEAR    Final    Specific gravity 05/02/2017 1.020  1.001 - 1.023   Final    pH (UA) 05/02/2017 6.0  5.0 - 9.0   Final    Protein 05/02/2017 NEGATIVE   NEG mg/dL Final    Glucose 05/02/2017 NEGATIVE   mg/dL Final    Ketone 05/02/2017 NEGATIVE   NEG mg/dL Final    Bilirubin 05/02/2017 NEGATIVE   NEG   Final    Blood 05/02/2017 NEGATIVE   NEG   Final    Urobilinogen 05/02/2017 0.2  0.2 - 1.0 EU/dL Final    Nitrites 05/02/2017 NEGATIVE   NEG   Final    Leukocyte Esterase 05/02/2017 NEGATIVE   NEG   Final    Special Requests: 05/02/2017 NASAL    Final    Culture result: 05/02/2017 SA target not detected. A MRSA NEGATIVE, SA NEGATIVE test result does not preclude MRSA or SA nasal colonization.     Final    Ventricular Rate 05/02/2017 81  BPM Final    Atrial Rate 05/02/2017 81  BPM Final    P-R Interval 05/02/2017 172  ms Final    QRS Duration 05/02/2017 78  ms Final    Q-T Interval 05/02/2017 348  ms Final    QTC Calculation (Bezet) 05/02/2017 404  ms Final    Calculated P Axis 05/02/2017 43  degrees Final    Calculated R Axis 05/02/2017 -4  degrees Final    Calculated T Axis 05/02/2017 25  degrees Final    Diagnosis 05/02/2017    Final                    Value:Normal sinus rhythm  Normal ECG  No previous ECGs available  Confirmed by Dank Douglas MD (), Suzie Ryan ([id number]) on 5/2/2017 1:42:26 PM                   There is no problem list on file for this patient.         Signed By: CHEPE Moreno  May 24, 2017

## 2017-05-24 NOTE — CONSULTS
MD Dagoberto   Medical Director  [address] [place] [address], [address] [facility]  Tel: [phone]     Physical Medicine & Rehabilitation Note-consult    Patient: Christ Mcneil MRN: [id number]  SSN: [id number]    YOB: 1965  Age: 47 y.o. Sex: male      Admit Date: 5/24/2017  Admitting Physician: Ramsey Ruiz MD    Medical Decision Making/Plan/Recommend: Gait impairment s/p right total knee arthroplasty. Therapy progressing steadily, without major barrier to progress. Progressing toward short term goals to allow safe community d/c. Continued rehab at home via [facility] PT would be reasonable and necessary. Continue PT, OT for active/assisted/passive right TKA ROM, strengthening, mobility, transfers, gait training. Will follow progress. Chief Complaint : Gait dysfunction secondary to below. Admit Diagnosis: Primary osteoarthritis of one knee, right [M17.11]  right total knee arthroplasty 5/24/2017  Pain  DVT risk  Post op hemorrhagic anemia  Arthritis  Morbid obesity ([facility] [address]  Acute Rehab Dx:  Gait impairment  Debility    Mobility and ambulation deficits  Self Care/ADL deficits    Medical Dx:  Past Medical History:   Diagnosis Date    Abnormal hemoglobin (Hgb) ([facility] [address] 05/02/2017    Hgb 17.7-patient takes weekly testosterone injection     Anxiety     managed with medication     Arrhythmia     [place] A fib dx 10/31/15 after binge drinking. IV Cardiazem given and converted to NSR. Patient took Cardiazem PO for 6 months and then discontinued. Now takes 81 mg ASA daily. Last EKG 5/2/17 NSR. No hx of cardioversion.      Arthritis     OA-bilateral knees     CAD (coronary artery disease)     mild MI per patient in 2001- no intervention/no blockages; then 10/31/15 \"I drank heavily and I went into a fib\"- went to the ED- started on Cardizem- stress test normal    Chronic pain     knees    H/O echocardiogram 11/02/2015    EF 55-60%, Trace mitral regurgitation, Mild tricuspid regurgitation, diastolic dysfunction grade II.  History of MI (myocardial infarction) 2001    Followed by Dr. Zoila Meredith at [facility].  Hypertension     controlled with med    Morbid obesity ([address] 06/03/2016    Nausea & vomiting     no problems with nausea after knee scope     Status post total right knee replacement 5/24/2017    Thyroid disease     hypo- on med     Subjective:     Date of Evaluation:  May 25, 2017    HPI: Martha Browne is a 47 y.o. male patient at [address] [place] [address] who was admitted on 5/24/2017  by Mildred Davalos MD with below mentioned medical history, is being seen for Physical Medicine and Rehabilitation consult. Martha Browne with severe DJD of the right knee pain refractory to conservative treatment including intra-articular cortisone injections and modification of activities. He underwent a right total knee arthroplasty per Dr. Mildred Davalos MD on 5/24/2017. We are consulted to assist with rehab needs and placement. Patient is to be WBAT RLE. Post op rehab sessions well tolerated. However he is still very limited by right knee pain, decreased ROM and mildly decreased strength. Martha Browne is seen and examined today. Medical Records reviewed. He has been independent with ambulation, prior to admission, limited by right knee pain. Prior Level of Function/Work/Activity:   Mod I with ADLs prior    Current Level of Function:  bed mobility - min A, transfers - min A, decreased balance , ambulation -  5' with RW and min A.        Family History   Problem Relation Age of Onset    Diabetes Mother      type 2; insulin dep    Hypertension Mother     Heart Disease Father     Hypertension Father       Social History   Substance Use Topics    Smoking status: Never Smoker    Smokeless tobacco: Never Used    Alcohol use No      Comment: hx of heavy use     Past Surgical History:   Procedure Laterality Date    HX COLONOSCOPY      HX KNEE ARTHROSCOPY Left 06/2016    HX ORTHOPAEDIC Right     age 14- X 2 - ? meniscus/ cartilage    HX TONSILLECTOMY      age 23      Prior to Admission medications    Medication Sig Start Date End Date Taking? Authorizing Provider   aspirin delayed-release 325 mg tablet Take 1 Tab by mouth every twelve (12) hours every twelve (12) hours for 35 days. 5/25/17 6/29/17 Yes CHEPE Covington   traMADol (ULTRAM) 50 mg tablet Take 1 Tab by mouth every six (6) hours as needed. Max Daily Amount: 200 mg. 5/25/17  Yes CHEPE Covington   ALPRAZolam [place] ) 1 mg tablet Take 1 mg by mouth nightly as needed for Anxiety. Yes Historical Provider   levothyroxine (SYNTHROID) 100 mcg tablet Take 100 mcg by mouth Daily (before breakfast). Take DOS per anesthesia protocol. Yes Historical Provider   atorvastatin (LIPITOR) 20 mg tablet Take 20 mg by mouth daily. Take DOS per anesthesia protocol   Yes Historical Provider   ibuprofen (MOTRIN) 200 mg tablet Take 200 mg by mouth every six (6) hours as needed for Pain. Yes Historical Provider   acetaminophen (TYLENOL) 325 mg tablet Take  by mouth every four (4) hours as needed for Pain. Take DOS if needed per anesthesia protocol. Yes Historical Provider   enalapril (VASOTEC) 10 mg tablet Take 10 mg by mouth every morning. Yes Historical Provider   TESTOSTERONE IM by IntraMUSCular route every Wednesday. Yes Historical Provider   multivitamin (ONE A DAY) tablet Take 1 Tab by mouth daily. Last dose 6/8/16   Yes Historical Provider   aspirin delayed-release 81 mg tablet Take 81 mg by mouth every morning. Take DOS per anesthesia protocol. Yes Historical Provider   tadalafil (CIALIS) 5 mg tablet Take 5 mg by mouth daily as needed. Historical Provider     Allergies   Allergen Reactions    Bactrim [Sulfamethoprim Ds] Rash        Review of Systems: +right knee pain, +antalgic gait.  Denies chest pain, shortness of breath, cough, headache, visual problems, abdominal pain, dysurea, calf pain. Pertinent positives are as noted in the medical records and unremarkable otherwise. Objective:     Vitals:  Blood pressure 147/59, pulse 91, temperature 97.5 °F (36.4 °C), resp. rate 18, height 5' 9\" (1.753 m), weight 245 lb (111.1 kg), SpO2 97 %. Temp (24hrs), Av.1 °F (36.2 °C), Min:96.3 °F (35.7 °C), Max:97.5 °F (36.4 °C)    BMI (calculated): 36.2 (17 1723)   Intake and Output:   1901 -  0700  In: 3500 [I.V.:3500]  Out: 8080 [Urine:2190]    Physical Exam:   General: Alert and age appropriately oriented. No acute cardio respiratory distress. HEENT: Normocephalic, no conjunctival pallor, no scleral icterus  Oral mucosa moist without cyanosis, no JVD   Lungs: Clear to auscultation bilaterally. Respiration even and unlabored   Heart: Regular rate and rhythm, S1, S2   No  murmurs, clicks, rub or gallops   Abdomen: Soft, non-tender, non-distended. Genitourinary: defered   Neuromuscular:      Grossly no focal motor deficits. Right knee extension strong  Right ankle dorsiflexion 5/5  Right ankle plantarflexion 5/5  No sensory deficits distally BLE to soft touch. Skin/extremity: No calf tenderness BLE. No rashes, no marginal erythema.                                                                                          Labs/Studies:  Recent Results (from the past 72 hour(s))   TYPE & SCREEN    Collection Time: 17 10:05 AM   Result Value Ref Range    Crossmatch Expiration 2017     ABO/Rh(D) Bernerd Amel POSITIVE     Antibody screen NEG    GLUCOSE, POC    Collection Time: 17 10:14 AM   Result Value Ref Range    Glucose (POC) 105 (H) 65 - 100 mg/dL   HEMOGLOBIN    Collection Time: 17  7:51 PM   Result Value Ref Range    HGB 16.8 13.6 - 17.2 g/dL   HEMOGLOBIN    Collection Time: 17  5:39 AM   Result Value Ref Range    HGB 15.6 13.6 - 08.4 g/dL   METABOLIC PANEL, BASIC    Collection Time: 17  5:39 AM   Result Value Ref Range Sodium 140 136 - 145 mmol/L    Potassium 4.2 3.5 - 5.1 mmol/L    Chloride 104 98 - 107 mmol/L    CO2 24 21 - 32 mmol/L    Anion gap 12 7 - 16 mmol/L    Glucose 119 (H) 65 - 100 mg/dL    BUN 16 6 - 23 MG/DL    Creatinine 1.05 0.8 - 1.5 MG/DL    GFR est AA >60 >60 ml/min/1.73m2    GFR est non-AA >60 >60 ml/min/1.73m2    Calcium 8.6 8.3 - 10.4 MG/DL   PLEASE READ & DOCUMENT PPD TEST IN 24 HRS    Collection Time: 05/25/17  9:25 AM   Result Value Ref Range    PPD  Negative    mm Induration 0 mm       Functional Assessment:  Reviewed participation and progress in therapies      Bed Mobility  Supine to Sit: Minimum assistance (05/25/17 0850)  Scooting: Contact guard assistance (05/25/17 0850)       Grooming  Grooming Assistance: Supervision/set up (05/25/17 1025)  Washing Face: Supervision/set-up (05/25/17 1025)  Brushing Teeth: Supervision/set-up (05/25/17 1025)           Bed/Mat Mobility  Supine to Sit: Minimum assistance (05/25/17 0850)  Sit to Stand: Minimum assistance (05/25/17 0850)  Bed to Chair: Minimum assistance (05/25/17 0850)  Scooting: Contact guard assistance (05/25/17 0850)     Ambulation:       Impression/Plan:     Principal Problem:    Status post total right knee replacement (5/24/2017)        Current Facility-Administered Medications   Medication Dose Route Frequency Provider Last Rate Last Dose    ALPRAZolam (XANAX) tablet 1 mg  1 mg Oral QHS PRN CHEPE Lang        enalapril (VASOTEC) tablet 10 mg  10 mg Oral DAILY CHEPE Lang   10 mg at 05/25/17 0835    levothyroxine (SYNTHROID) tablet 100 mcg  100 mcg Oral ACB Sophie Freedman [address]   100 mcg at 05/25/17 4726    0.9% sodium chloride infusion  100 mL/hr IntraVENous CONTINUOUS Sophie Freedman [address]        sodium chloride (NS) flush 5-10 mL  5-10 mL IntraVENous Q8H [address]        sodium chloride (NS) flush 5-10 mL  5-10 mL IntraVENous PRN CHEPE Lang        acetaminophen (TYLENOL) tablet 1,000 mg  1,000 mg Oral Q6H CHEPE Coughlin   1,000 mg at 05/25/17 8670    celecoxib (CELEBREX) capsule 200 mg  200 mg Oral Q12H [place], [place]   200 mg at 05/25/17 6596    naloxone [facility]) injection 0.2-0.4 mg  0.2-0.4 mg IntraVENous Q10MIN PRN CHEPE Coughlin        dexamethasone (DECADRON) injection 10 mg  10 mg IntraVENous ONCE [place]        [facility]ron [facility] injection 4 mg  4 mg IntraVENous Q4H PRN CHEPE Coughlin   4 mg at 05/24/17 1950    diphenhydrAMINE (BENADRYL) capsule 25 mg  25 mg Oral Q4H PRN CHEPE Coughlin        senna-docusate (PERICOLACE) 8.6-50 mg per tablet 2 Tab  2 Tab Oral DAILY [place]   2 Tab at 05/25/17 5429    aspirin delayed-release tablet 325 mg  325 mg Oral Q12H [place], [place]   325 mg at 05/25/17 9187    HYDROmorphone (PF) (DILAUDID) injection 1 mg  1 mg IntraVENous Q3H PRN CHEPE Coughlin        traMADol Lela Minus) tablet 50 mg  50 mg Oral Q6H PRN CHEPE Coughlin   50 mg at 05/25/17 0021    promethazine (PHENERGAN) injection 12.5 mg  12.5 mg IntraMUSCular Q4H PRN Rajat Ames MD   12.5 mg at 05/24/17 2152        Recommendations:   Plan for home discharge with [place] PT. Continue Acute Rehab Program.  Coordination of rehab/medical care. Counseling of Physical Medicine & Rehab care issues management. Monitoring and management of rehab conditions per the plan of care/orders. Rehabilitation Management/ Medical Management: 1. Devices:Walkers, Type: Rolling Walker  2. Consult:Rehab team including PT, OT,  and . 3. Disposition Rehab-discussed with patient. 4. Thigh-high or knee-high SHELLEY's when out of bed. 5. DVT Prophylaxis - aspirin 325mg bid x 30days. 6. Incentive spirometer Q1H while awake  7. Post op hemorrhagic anemia-monitor. 8. Activity: WBAT RLE  9. Planned Labs: CBC,BMP  10. Pain Control: Adequately controlled with scheduled tylenol, celebrex and  PRN meds. Continue current management.   11. Wound Care: Keep right TKA wound clean and dry and reinforce dressing PRN. May remove Aquacel 1 week post op ad replace with new one. Remove staples 12-14 post surgery, when incision appears appropriately closed and apply benzoin and 1/2\" steristrips. Follow up with Dr Michelle Jiang  2 weeks after discharge from rehab. Follow up with ORTHO per instructions. Thank you for the opportunity to participate in the care of this patient.     Signed By: Raya Reyes MD     May 25, 2017

## 2017-05-24 NOTE — PROGRESS NOTES
Care Management Interventions  Mode of Transport at Discharge: Self  Transition of Care Consult (CM Consult): [address]: No  Reason Outside [place]: Out of service area  Discharge Durable Medical Equipment: Yes  Physical Therapy Consult: Yes  Occupational Therapy Consult: Yes  Current Support Network: Lives with Spouse  Confirm Follow Up Transport: Family  Plan discussed with Pt/Family/Caregiver: Yes  Freedom of Choice Offered: Yes  Discharge Location  Discharge Placement: Home with home health  Patient is a 47y.o. year old male admitted for Right TKA . Patient lives with His spouse and plans to return home on discharge. Order received to arrange home health. Patient without preference towards agency. Referral sent to [facility]. Patient  has a walker. Patient requesting we arrange a bedside commode. Referral sent to [facility] who will deliver to the hospital room prior to discharge. Will follow until discharge.   Dario Pearson

## 2017-05-25 VITALS
OXYGEN SATURATION: 98 % | HEART RATE: 84 BPM | TEMPERATURE: 98.2 F | DIASTOLIC BLOOD PRESSURE: 60 MMHG | WEIGHT: 245 LBS | HEIGHT: 69 IN | SYSTOLIC BLOOD PRESSURE: 127 MMHG | RESPIRATION RATE: 18 BRPM | BODY MASS INDEX: 36.29 KG/M2

## 2017-05-25 LAB
ANION GAP BLD CALC-SCNC: 12 MMOL/L (ref 7–16)
BUN SERPL-MCNC: 16 MG/DL (ref 6–23)
CALCIUM SERPL-MCNC: 8.6 MG/DL (ref 8.3–10.4)
CHLORIDE SERPL-SCNC: 104 MMOL/L (ref 98–107)
CO2 SERPL-SCNC: 24 MMOL/L (ref 21–32)
CREAT SERPL-MCNC: 1.05 MG/DL (ref 0.8–1.5)
GLUCOSE SERPL-MCNC: 119 MG/DL (ref 65–100)
HGB BLD-MCNC: 15.6 G/DL (ref 13.6–17.2)
MM INDURATION POC: 0 MM (ref 0–5)
POTASSIUM SERPL-SCNC: 4.2 MMOL/L (ref 3.5–5.1)
PPD POC: NORMAL NEGATIVE
SODIUM SERPL-SCNC: 140 MMOL/L (ref 136–145)

## 2017-05-25 PROCEDURE — 97161 PT EVAL LOW COMPLEX 20 MIN: CPT

## 2017-05-25 PROCEDURE — 97116 GAIT TRAINING THERAPY: CPT

## 2017-05-25 PROCEDURE — 74011250637 HC RX REV CODE- 250/637: Performed by: PHYSICIAN ASSISTANT

## 2017-05-25 PROCEDURE — 77030012935 HC DRSG AQUACEL BMS -B

## 2017-05-25 PROCEDURE — 80048 BASIC METABOLIC PNL TOTAL CA: CPT | Performed by: PHYSICIAN ASSISTANT

## 2017-05-25 PROCEDURE — 36415 COLL VENOUS BLD VENIPUNCTURE: CPT | Performed by: PHYSICIAN ASSISTANT

## 2017-05-25 PROCEDURE — 97150 GROUP THERAPEUTIC PROCEDURES: CPT

## 2017-05-25 PROCEDURE — 97110 THERAPEUTIC EXERCISES: CPT

## 2017-05-25 PROCEDURE — 85018 HEMOGLOBIN: CPT | Performed by: PHYSICIAN ASSISTANT

## 2017-05-25 PROCEDURE — 94762 N-INVAS EAR/PLS OXIMTRY CONT: CPT

## 2017-05-25 PROCEDURE — 77010033678 HC OXYGEN DAILY

## 2017-05-25 PROCEDURE — 97165 OT EVAL LOW COMPLEX 30 MIN: CPT

## 2017-05-25 PROCEDURE — 94760 N-INVAS EAR/PLS OXIMETRY 1: CPT

## 2017-05-25 PROCEDURE — 74011250636 HC RX REV CODE- 250/636: Performed by: PHYSICIAN ASSISTANT

## 2017-05-25 PROCEDURE — 97535 SELF CARE MNGMENT TRAINING: CPT

## 2017-05-25 RX ORDER — TRAMADOL HYDROCHLORIDE 50 MG/1
50 TABLET ORAL
Qty: 40 TAB | Refills: 0 | Status: SHIPPED | OUTPATIENT
Start: 2017-05-25

## 2017-05-25 RX ORDER — ASPIRIN 325 MG
325 TABLET, DELAYED RELEASE (ENTERIC COATED) ORAL EVERY 12 HOURS
Qty: 70 TAB | Refills: 0 | Status: SHIPPED | OUTPATIENT
Start: 2017-05-25 | End: 2017-06-29

## 2017-05-25 RX ADMIN — ENALAPRIL MALEATE 10 MG: 10 TABLET ORAL at 09:27

## 2017-05-25 RX ADMIN — SENNOSIDES AND DOCUSATE SODIUM 2 TABLET: 8.6; 5 TABLET ORAL at 09:27

## 2017-05-25 RX ADMIN — TRAMADOL HYDROCHLORIDE 50 MG: 50 TABLET, FILM COATED ORAL at 15:35

## 2017-05-25 RX ADMIN — ACETAMINOPHEN 1000 MG: 500 TABLET, FILM COATED ORAL at 00:17

## 2017-05-25 RX ADMIN — TRAMADOL HYDROCHLORIDE 50 MG: 50 TABLET, FILM COATED ORAL at 00:21

## 2017-05-25 RX ADMIN — ACETAMINOPHEN 1000 MG: 500 TABLET, FILM COATED ORAL at 06:23

## 2017-05-25 RX ADMIN — ASPIRIN 325 MG: 325 TABLET, DELAYED RELEASE ORAL at 00:16

## 2017-05-25 RX ADMIN — CEFAZOLIN 2 G: 1 INJECTION, POWDER, FOR SOLUTION INTRAMUSCULAR; INTRAVENOUS; PARENTERAL at 04:39

## 2017-05-25 RX ADMIN — ACETAMINOPHEN 1000 MG: 500 TABLET, FILM COATED ORAL at 12:13

## 2017-05-25 RX ADMIN — LEVOTHYROXINE SODIUM 100 MCG: 100 TABLET ORAL at 06:23

## 2017-05-25 RX ADMIN — ASPIRIN 325 MG: 325 TABLET, DELAYED RELEASE ORAL at 09:27

## 2017-05-25 RX ADMIN — CELECOXIB 200 MG: 200 CAPSULE ORAL at 09:27

## 2017-05-25 NOTE — PROGRESS NOTES
Slept at intervals during shift. No further c/o voiced. No change in [place] status noted. Family member remains at bedside. Call light within reach.

## 2017-05-25 NOTE — PROGRESS NOTES
Problem: Self Care Deficits Care Plan (Adult)  Goal: *Acute Goals and Plan of Care (Insert Text)  GOALS:   DISCHARGE GOALS (in preparation for going home/rehab): 3 days  1. Mr. Dell Alfred will perform one lower body dressing activity with minimal assistance required to demonstrate improved functional mobility and safety. GOAL MET 5/25/2017     2. Mr. Dell Alferd will perform one lower body bathing activity with minimal assistance required to demonstrate improved functional mobility and safety. GOAL MET 5/25/2017    3. Mr. Dell Alfred will perform toileting/toilet transfer with contact guard assistance to demonstrate improved functional mobility and safety. GOAL MET 5/25/2017      4. Mr. Dell Alfred will perform shower transfer with contact guard assistance to demonstrate improved functional mobility and safety. GOAL MET 5/25/2017        JOINT CAMP OCCUPATIONAL THERAPY TKA: Daily Note and AM 5/25/2017  INPATIENT: Hospital Day: 2  Payor: [facility] / [place]ERRY / Product Type: Diana Jernigan /      NAME/AGE/GENDER: Queta Ortiz is a 47 y.o. male  PRIMARY DIAGNOSIS:  Primary osteoarthritis of one knee, right [M17.11]              Procedure(s) and Anesthesia Type:     * RIGHT KNEE ARTHROPLASTY TOTAL - Spinal (Right)  ICD-10: Treatment Diagnosis:        · Pain in Right Knee (M25.561)  · Stiffness of Right Knee, Not elsewhere classified (M25.661)  · Other lack of cordination (R27.8)       ASSESSMENT:       Mr. Dell Alfred is s/p right TKA and presents with decreased weight bearing on right LE and decreased independence with functional mobility and activities of daily living. Patient completed shower and dressing as charter below in ADL grid and is ambulating with rolling walker and stand by  assist.  Patient has met 4/4 goals and plans to return home with good family support. Family able to provide patient with appropriate level of assistance at this time.   OT reviewed safety precautions throughout session and therapy schedule for the remainder of today. Patient instructed to call for assistance when needing to get up from recliner and all needs in reach. Patient verbalized understanding of call light. This section established at most recent assessment   PROBLEM LIST (Impairments causing functional limitations):  1. Decreased Strength  2. Decreased ADL/Functional Activities  3. Decreased Transfer Abilities  4. Increased Pain  5. Increased Fatigue  6. Decreased Flexibility/Joint Mobility  7. Decreased Knowledge of Precautions    INTERVENTIONS PLANNED: (Benefits and precautions of occupational therapy have been discussed with the patient.)  1. Activities of daily living training  2. Adaptive equipment training  3. Balance training  4. Clothing management  5. Donning&doffing training  6. Theraputic activity      TREATMENT PLAN: Frequency/Duration: Follow patient 1time to address above goals. Rehabilitation Potential For Stated Goals: GOOD      RECOMMENDED REHABILITATION/EQUIPMENT: (at time of discharge pending progress): Continue Skilled Therapy and Home Health: Physical Therapy. OCCUPATIONAL PROFILE AND HISTORY:   History of Present Injury/Illness (Reason for Referral): Pt presents this date s/p (right) TKA. Past Medical History/Comorbidities:   Mr. Araceli Kearney  has a past medical history of Abnormal hemoglobin (Hgb) ([facility] [address] (05/02/2017); Anxiety; Arrhythmia; Arthritis; CAD (coronary artery disease); Chronic pain; H/O echocardiogram (11/02/2015); History of MI (myocardial infarction) (2001); Hypertension; Morbid obesity ([facility] [address] (06/03/2016); Nausea & vomiting; Status post total right knee replacement (5/24/2017); and Thyroid disease. He also has no past medical history of Adverse effect of anesthesia; Difficult intubation; Malignant hyperthermia due to anesthesia; or Pseudocholinesterase deficiency.   Mr. Araceli Kearney  has a past surgical history that includes orthopaedic (Right); tonsillectomy; knee arthroscopy (Left, 06/2016); and colonoscopy. Social History/Living Environment:   Home Environment: Private residence  # Steps to Enter: 2  Rails to Enter: No  One/Two Story Residence: One story  Living Alone: No  Support Systems: Spouse/Significant Other/Partner  Patient Expects to be Discharged to[de-identified] Private residence  Current DME Used/Available at Home: Anil Miss, quad, Walker, rolling  Tub or Shower Type: Shower  Prior Level of Function/Work/Activity:  Mod I with ADLs prior      Number of Personal Factors/Comorbidities that affect the Plan of Care: Brief history (0):  LOW COMPLEXITY   ASSESSMENT OF OCCUPATIONAL PERFORMANCE[de-identified]   Most Recent Physical Functioning:            Patient Vitals for the past 6 hrs:   BP BP Patient Position SpO2 O2 Flow Rate (L/min) Pulse   05/25/17 0752 147/59 At rest 95 % - 91   05/25/17 0846 - - 97 % 2 l/min -                       Coordination  Fine Motor Skills-Upper: Left Intact; Right Intact  Gross Motor Skills-Upper: Left Intact; Right Intact           Mental Status  Neurologic State: Alert; Appropriate for age  Orientation Level: Appropriate for age  Cognition: Appropriate decision making; Appropriate for age attention/concentration; Appropriate safety awareness; Follows commands  Perception: Appears intact  Perseveration: No perseveration noted  Safety/Judgement: Awareness of environment; Fall prevention                    Basic ADLs (From Assessment) Complex ADLs (From Assessment)   Basic ADL  Feeding: Setup  Oral Facial Hygiene/Grooming: Supervision  Bathing: Moderate assistance  Upper Body Dressing: Supervision  Lower Body Dressing: Moderate assistance  Toileting: Minimum assistance     Grooming/Bathing/Dressing Activities of Daily Living   Grooming  Grooming Assistance: Supervision/set up  Washing Face: Supervision/set-up  Brushing Teeth: Supervision/set-up Cognitive Retraining  Safety/Judgement: Awareness of environment; Fall prevention   Upper Body Bathing  Bathing Assistance: Supervision/set-up  Position Performed: Seated in chair;Standing     Lower Body Bathing  Bathing Assistance: Minimum assistance  Perineal  : Stand-by assistance  Position Performed: Standing  Lower Body : Minimum assistance  Position Performed: Seated in chair;Standing  Adaptive Equipment: Grab bar; Shower chair     Upper Body Dressing Assistance  Dressing Assistance: Supervision/set-up  Pullover Shirt: Supervision/set-up Functional Transfers  Toilet Transfer : Stand-by asssistance  Shower Transfer: Stand-by asssistance   Lower Body Dressing Assistance  Dressing Assistance: Minimum assistance  Underpants: Minimum assistance  Pants With Elastic Waist: Minimum assistance  Socks: Moderate assistance  Antiembolitic Stockings: Maximum assistance Bed/Mat Mobility  Supine to Sit: Minimum assistance  Sit to Stand: Minimum assistance  Bed to Chair: Minimum assistance  Scooting: Contact guard assistance           Physical Skills Involved:  1. Range of Motion  2. Balance  3. Mobility Cognitive Skills Affected (resulting in the inability to perform in a timely and safe manner): 1. none Psychosocial Skills Affected:  1. Habits   Number of elements that affect the Plan of Care: 3-5:  MODERATE COMPLEXITY   CLINICAL DECISION MAKIN[address] [place] [address] AM-PAC 6 Clicks   Basic Mobility Inpatient Short Form  How much help from another person does the patient currently need. .. Total A Lot A Little None   1. Putting on and taking off regular lower body clothing?   [ ] 1   [X] 2   [ ] 3   [ ] 4   2. Bathing (including washing, rinsing, drying)? [ ] 1   [X] 2   [ ] 3   [ ] 4   3. Toileting, which includes using toilet, bedpan or urinal?   [ ] 1   [ ] 2   [X] 3   [ ] 4   4. Putting on and taking off regular upper body clothing?   [ ] 1   [ ] 2   [X] 3   [ ] 4   5. Taking care of personal grooming such as brushing teeth? [ ] 1   [ ] 2   [X] 3   [ ] 4   6. Eating meals?    [ ] 1   [ ] 2   [ ] 3   [X] 4   © 2007, Trustees of [facility] [address] [address], under license to [employer]. All rights reserved   Score:  Initial: 17 Most Recent: X (Date: -- )     Interpretation of Tool:  Represents activities that are increasingly more difficult (i.e. Bed mobility, Transfers, Gait). Score 24 23 22-20 19-15 14-10 9-7 6       Modifier CH CI CJ CK CL CM CN         · Self Care:               - CURRENT STATUS:    CK - 40%-59% impaired, limited or restricted               - GOAL STATUS:           CJ - 20%-39% impaired, limited or restricted               - D/C STATUS:                       ---------------To be determined---------------  Payor: BLUE CROSS / [place]ERR[place] / Product Type: JERRY /       Medical Necessity:     · Patient is expected to demonstrate progress in balance, coordination and functional technique to decrease assistance required with self care and functional mobility and improve safety during self care and functional mobility. Reason for Services/Other Comments:  · Patient continues to require skilled intervention due to decreased self care and functional mobility. Use of outcome tool(s) and clinical judgement create a POC that gives a: LOW COMPLEXITY                 TREATMENT:   (In addition to Assessment/Re-Assessment sessions the following treatments were rendered)      Pre-treatment Symptoms/Complaints:  Complain of knee pain at rest  Pain: Initial:   Pain Intensity 1: 3  Pain Location 1: Knee  Pain Orientation 1: Right  Pain Intervention(s) 1: Shower  Post Session:  4/10 resting in recliner,  Iceman in place      Self Care: (25): Procedure(s) (per grid) utilized to improve and/or restore self-care/home management as related to dressing, bathing, toileting and grooming. Required minimal visual and verbal cueing to facilitate activities of daily living skills and compensatory activities. Treatment/Session Assessment:         Response to Treatment:  Tolerated well, may go home today. Education:  [ ] Home Exercises  [X] Fall Precautions  [ ] Hip Precautions [ ] Going Home Video  [X] Knee/Hip Prosthesis Review  [X] Walker Management/Safety [X] Adaptive Equipment as Needed         Interdisciplinary Collaboration:   · Occupational Therapist  · Registered Nurse     After treatment position/precautions:   · Up in chair  · Bed/Chair-wheels locked  · Call light within reach  · RN notified  · Family at bedside     Compliance with Program/Exercises: compliant all of the time. Recommendations/Intent for next treatment session:  Treatment next visit will focus on increasing Mr. Dariela Brown independence with  self care and functional mobility modalities for pain, and patient education.        Total Treatment Duration:25  OT Patient Time In/Time Out  Time In: 1025  Time Out: Juan 141, OT

## 2017-05-25 NOTE — PROGRESS NOTES
Problem: Mobility Impaired (Adult and Pediatric)  Goal: *Acute Goals and Plan of Care (Insert Text)  GOALS (1-4 days):  (1.)Mr. Zurdo Coelho will move from supine to sit and sit to supine in bed with STAND BY ASSIST.  (2.)Mr. Zurdo Coelho will transfer from bed to chair and chair to bed with STAND BY ASSIST using the least restrictive device. (3.)Mr. Zurdo Coelho will ambulate with STAND BY ASSIST for 200 feet with the least restrictive device. (4.)Mr. Zurdo Coelho will ambulate up/down 3 steps with right railing with CONTACT GUARD ASSIST with no device. (5.)Mr. Zurdo Coelho will increase right knee ROM to 5°-80°.   ________________________________________________________________________________________________      PHYSICAL THERAPY JOINT CAMP TKA: INITIAL ASSESSMENT 5/25/2017  INPATIENT: Hospital Day: 2  Payor: Clotilda Meckel / [place] / Product Type: Kenrick Carrington /      NAME/AGE/GENDER: Leif Jon is a 47 y.o. male  PRIMARY DIAGNOSIS:  Primary osteoarthritis of one knee, right [M17.11]              Procedure(s) and Anesthesia Type:     * RIGHT KNEE ARTHROPLASTY TOTAL - Spinal (Right)  ICD-10: Treatment Diagnosis:        · Pain in Right Knee (M25.561)  · Stiffness of Right Knee, Not elsewhere classified (M25.661)  · Difficulty in walking, Not elsewhere classified (R26.2)  · Other abnormalities of gait and mobility (R26.89)       ASSESSMENT:      Mr. Zurdo Coelho presents with decreased strength and ROM R LE and limited functional mobility S/P R TKA. He will benefit from skilled therapy services to address the below problem list.       This section established at most recent assessment   PROBLEM LIST (Impairments causing functional limitations):  1. Decreased Strength  2. Decreased ADL/Functional Activities  3. Decreased Transfer Abilities  4. Decreased Ambulation Ability/Technique  5. Decreased Flexibility/Joint Mobility  6.  Decreased Saline with Home Exercise Program    INTERVENTIONS PLANNED: (Benefits and precautions of physical therapy have been discussed with the patient.)  1. Bed Mobility  2. Gait Training  3. Home Exercise Program (HEP)  4. Therapeutic Exercise/Strengthening  5. Transfer Training  6. Range of Motion: active/assisted/passive  7. Therapeutic Activities  8. Group Therapy      TREATMENT PLAN: Frequency/Duration: Follow patient BID   to address above goals. Rehabilitation Potential For Stated Goals: GOOD      RECOMMENDED REHABILITATION/EQUIPMENT: (at time of discharge pending progress): Continue Skilled Therapy and Home Health: Physical Therapy. HISTORY:   History of Present Injury/Illness (Reason for Referral):  S/P R TKA  Past Medical History/Comorbidities:   Mr. James Gutierrez  has a past medical history of Abnormal hemoglobin (Hgb) ([facility] [address] (05/02/2017); Anxiety; Arrhythmia; Arthritis; CAD (coronary artery disease); Chronic pain; H/O echocardiogram (11/02/2015); History of MI (myocardial infarction) (2001); Hypertension; Morbid obesity ([facility] [address] (06/03/2016); Nausea & vomiting; Status post total right knee replacement (5/24/2017); and Thyroid disease. He also has no past medical history of Adverse effect of anesthesia; Difficult intubation; Malignant hyperthermia due to anesthesia; or Pseudocholinesterase deficiency. Mr. James Gutierrez  has a past surgical history that includes orthopaedic (Right); tonsillectomy; knee arthroscopy (Left, 06/2016); and colonoscopy. Social History/Living Environment:   Home Environment: Private residence  # Steps to Enter: 2  Rails to Enter: No  One/Two Story Residence: One story  Living Alone: No  Support Systems: Spouse/Significant Other/Partner  Patient Expects to be Discharged to[de-identified] Private residence  Current DME Used/Available at Home: [employer], quad, Walker, rolling  Tub or Shower Type: Shower  Prior Level of Function/Work/Activity:  Independent with gait and ADLs.    Number of Personal Factors/Comorbidities that affect the Plan of Care: 0: LOW COMPLEXITY   EXAMINATION:   Most Recent Physical Functioning:      Gross Assessment  AROM: Within functional limits (except R knee; S/p R TKA)  Strength: Within functional limits (except R LE; S/P R TKA)         RLE AROM  R Knee Flexion: 65 (approximate)  R Knee Extension: -15              Bed Mobility  Supine to Sit: Minimum assistance  Sit to Supine:  (left up in chair)  Scooting: Contact guard assistance     Transfers  Sit to Stand: Contact guard assistance;Minimum assistance  Stand to Sit: Contact guard assistance  Bed to Chair: Minimum assistance     Balance  Sitting: Intact  Standing: With support    Posture  Posture Assessment: Forward head;Rounded shoulders           Weight Bearing Status  Right Side Weight Bearing: As tolerated  Distance (ft): 100 Feet (ft)  Ambulation - Level of Assistance: Stand-by asssistance;Contact guard assistance  Assistive Device: Walker, rolling  Speed/Nicolette: Fluctuations  Stance: Right decreased  Gait Abnormalities: Antalgic  Interventions: Manual cues; Verbal cues; Safety awareness training      Braces/Orthotics: none     Right Knee Cold  Type: Cryocuff       Body Structures Involved:  1. Bones  2. Joints  3. Muscles Body Functions Affected:  1. Neuromusculoskeletal  2. Movement Related Activities and Participation Affected:  1. General Tasks and Demands  2. Mobility  3. Self Care   Number of elements that affect the Plan of Care: 1-2: LOW COMPLEXITY   CLINICAL PRESENTATION:   Presentation: Stable and uncomplicated: LOW COMPLEXITY   CLINICAL DECISION MAKIN [facility] [address] [address] AM-PAC 6 Clicks   Basic Mobility Inpatient Short Form  How much difficulty does the patient currently have. .. Unable A Lot A Little None   1. Turning over in bed (including adjusting bedclothes, sheets and blankets)? [ ] 1   [ ] 2   [ ] 3   [X] 4   2. Sitting down on and standing up from a chair with arms ( e.g., wheelchair, bedside commode, etc.)   [ ] 1   [ ] 2   [X] 3   [ ] 4   3.   Moving from lying on back to sitting on the side of the bed? [ ] 1   [ ] 2   [X] 3   [ ] 4   How much help from another person does the patient currently need. .. Total A Lot A Little None   4. Moving to and from a bed to a chair (including a wheelchair)? [ ] 1   [ ] 2   [X] 3   [ ] 4   5. Need to walk in hospital room? [ ] 1   [ ] 2   [X] 3   [ ] 4   6. Climbing 3-5 steps with a railing? [ ] 1   [ ] 2   [X] 3   [ ] 4   © 2007, Trustees of [address] [place] [address] [address] [address], under license to [employer]. All rights reserved       Score:  Initial: 19 Most Recent: X (Date: -- )     Interpretation of Tool:  Represents activities that are increasingly more difficult (i.e. Bed mobility, Transfers, Gait). Score 24 23 22-20 19-15 14-10 9-7 6       Modifier CH CI CJ CK CL CM CN         · Mobility - Walking and Moving Around:               - CURRENT STATUS:    CK - 40%-59% impaired, limited or restricted               - GOAL STATUS:           CJ - 20%-39% impaired, limited or restricted               - D/C STATUS:                       ---------------To be determined---------------  Payor: BLUE CROSS / [place]ERR[place] / Product Type: JERRY /       Medical Necessity:     · Patient demonstrates good rehab potential due to higher previous functional level. Reason for Services/Other Comments:  · Patient continues to require present interventions due to patient's inability to perform exercises and functional mobility independently.    Use of outcome tool(s) and clinical judgement create a POC that gives a: Clear prediction of patient's progress: LOW COMPLEXITY                 TREATMENT:   (In addition to Assessment/Re-Assessment sessions the following treatments were rendered)      Pre-treatment Symptoms/Complaints:  R knee pain  Pain: Initial:   Pain Intensity 1: 3  Pain Location 1: Knee  Pain Orientation 1: Right  Pain Intervention(s) 1: Cold pack, Repositioned  Post Session:  3      Therapeutic Exercise: (12 Minutes):  Exercises per grid below to improve mobility and strength. Required minimal verbal cues to perform exercises correctly. Progressed range and repetitions as indicated. Date:  5/25/17 Date:    Date:      ACTIVITY/EXERCISE AM PM AM PM AM PM   GROUP THERAPY  [ ]  [ ]  [ ]  [ ]  [ ]  [ ]   Ankle Pumps 10             Quad Sets 10             Gluteal Sets 10             Hip ABd/ADduction 10             Straight Leg Raises 10             Knee Slides 10             Short Arc Quads               Long Arc Quads               Chair Slides                               B = bilateral; AA = active assistive; A = active; P = passive       Treatment/Session Assessment:         Response to Treatment:  Tolerated very well; motivated. Education:  [X] Home Exercises  [X] Fall Precautions  [ ] Hip Precautions [ ] Going Home Video  [X] Knee/Hip Prosthesis Review  [X] Walker Management/Safety [ ] Adaptive Equipment as Needed         Interdisciplinary Collaboration:   · Physical Therapist  · Occupational Therapist  · Registered Nurse     After treatment position/precautions:   · Up in chair  · Bed/Chair-wheels locked  · Call light within reach  · RN notified  · Family at bedside     Compliance with Program/Exercises: compliant all of the time. Recommendations/Intent for next treatment session:  Treatment next visit will focus on increasing Mr. Barbi Stearns independence with bed mobility, transfers, gait training, strength/ROM exercises, modalities for pain, and patient education.        Total Treatment Duration:  PT Patient Time In/Time Out  Time In: 0945  Time Out: Cary Truong 65, PT

## 2017-05-25 NOTE — PROGRESS NOTES
Zofran 4mg given slow IVP for c/o nausea. Denies pain at present. Dsng dry and intact. Neurovascular status remains WDL. SCDs on bilaterally. Iceman cooling system in use. Family member at bedside. Call light within reach.

## 2017-05-25 NOTE — PROGRESS NOTES
Back from group therapy. Resting in bed. Has spot of bloody drainage on new aquacel placed prior to therapy.

## 2017-05-25 NOTE — CONSULTS
HOSPITALIST Consult  NAME:  Isaak Rosenberg   Age:  47 y.o.  :   1965   MRN:   [id number]  PCP: Lazaro Marie MD  Treatment Team: Attending Provider: Abelino Hernández MD; Consulting Provider: Lizzy Jesus MD; Consulting Provider: Mohan Rdz MD; Consulting Provider: Desi Malhorta MD  HPI:   Mr. James Gutierrez is a 48 yo male who underwent a right TKA, POD #1. Hospitalist consulted for medical management. Pt has hx of HTN, CAD, hypothyroid. Denies CP, SOB. Denies hx of DVT, PE. Results summary of Diagnostic Studies/Procedures copied from within [facility] EMR:       Complete ROS done and is as stated in HPI or otherwise negative  Past Medical History:   Diagnosis Date    [facility]    Hgb 17.7-patient takes weekly testosterone injection     Anxiety     managed with medication     Arrhythmia     [place] A fib dx 10/31/15 after binge drinking. IV Cardiazem given and converted to NSR. Patient took Cardiazem PO for 6 months and then discontinued. Now takes 81 mg ASA daily. Last EKG 17 NSR. No hx of cardioversion.  Arthritis     OA-bilateral knees     CAD (coronary artery disease)     mild MI per patient in - no intervention/no blockages; then 10/31/15 \"I drank heavily and I went into a fib\"- went to the ED- started on Cardizem- stress test normal    Chronic pain     knees    H/O echocardiogram 2015    EF 55-60%, Trace mitral regurgitation, Mild tricuspid regurgitation, diastolic dysfunction grade II.  History of MI (myocardial infarction)     Followed by Dr. Uri Luna at [facility].       Hypertension     controlled with med    Morbid obesity ([facility] 2016    Nausea & vomiting     no problems with nausea after knee scope     Status post total right knee replacement 2017    Thyroid disease     hypo- on med      Past Surgical History:   Procedure Laterality Date    HX COLONOSCOPY      HX KNEE ARTHROSCOPY Left 2016    HX ORTHOPAEDIC Right     age 14- X 2 - ? meniscus/ cartilage    HX TONSILLECTOMY      age 23      Allergies   Allergen Reactions    Bactrim [Sulfamethoprim Ds] Rash      Social History   Substance Use Topics    Smoking status: Never Smoker    Smokeless tobacco: Never Used    Alcohol use No      Comment: hx of heavy use      Family History   Problem Relation Age of Onset    Diabetes Mother      type 2; insulin dep    Hypertension Mother     Heart Disease Father     Hypertension Father         Objective:     Visit Vitals    /59 (BP 1 Location: Right arm, BP Patient Position: At rest)    Pulse 91    Temp 97.5 °F (36.4 °C)    Resp 18    Ht 5' 9\" (1.753 m)    Wt 111.1 kg (245 lb)    SpO2 97%    BMI 36.18 kg/m2      Temp (24hrs), Av °F (36.1 °C), Min:96.3 °F (35.7 °C), Max:97.5 °F (36.4 °C)    Oxygen Therapy  O2 Sat (%): 97 % (17 0846)  O2 Device: Nasal cannula (17 0846)  O2 Flow Rate (L/min): 2 l/min (decreased to ra) (17 8307)  Physical Exam:  General:    Alert, cooperative, no distress, appears stated age. Head:   Normocephalic, without obvious abnormality, atraumatic. Nose:  Nares normal. No drainage or sinus tenderness. Lungs:   CTA, resp even and nonlabored  Heart:  S1S2 present without murmurs rubs gallops. RRR. No edema  Abdomen:   Soft, non-tender. Not distended. Bowel sounds normal. No masses  Extremities: No cyanosis. Limited ROM RLE  Skin:     Texture, turgor normal. No rashes or lesions.   Not Jaundiced  Neurologic: Alert and oriented X3, no focal deficits    Data Review:   Recent Results (from the past 24 hour(s))   TYPE & SCREEN    Collection Time: 17 10:05 AM   Result Value Ref Range    Crossmatch Expiration 2017     ABO/Rh(D) Veena Coopers POSITIVE     Antibody screen NEG    GLUCOSE, POC    Collection Time: 17 10:14 AM   Result Value Ref Range    Glucose (POC) 105 (H) 65 - 100 mg/dL   HEMOGLOBIN    Collection Time: 17  7:51 PM   Result Value Ref Range    HGB 16.8 13.6 - 17.2 g/dL   HEMOGLOBIN    Collection Time: 05/25/17  5:39 AM   Result Value Ref Range    HGB 15.6 13.6 - 89.3 g/dL   METABOLIC PANEL, BASIC    Collection Time: 05/25/17  5:39 AM   Result Value Ref Range    Sodium 140 136 - 145 mmol/L    Potassium 4.2 3.5 - 5.1 mmol/L    Chloride 104 98 - 107 mmol/L    CO2 24 21 - 32 mmol/L    Anion gap 12 7 - 16 mmol/L    Glucose 119 (H) 65 - 100 mg/dL    BUN 16 6 - 23 MG/DL    Creatinine 1.05 0.8 - 1.5 MG/DL    GFR est AA >60 >60 ml/min/1.73m2    GFR est non-AA >60 >60 ml/min/1.73m2    Calcium 8.6 8.3 - 10.4 MG/DL       Assessment and Plan: Active Hospital Problems    Diagnosis Date Noted    Status post total right knee replacement 05/24/2017     HTN: chronic, controlled. Continue current regimen    CAD: aware    Hypothyroid:  Continue synthroid    Notes, labs, VS, diagnostic testing reviewed  Time spent with pt 20 min  Case discussed with pt, care team, Dr. Jewel Lopez  Thank you for this consult. We will sign off. Call with questions.        Duglas Banks NP

## 2017-05-25 NOTE — PROGRESS NOTES
Given prescriptions for aspirin and ultram and instructed how to take. Drainage has not increased but given extra aquacel. Home therapy to see pt. Has follow up appt scheduled with Dr Edward Byrne. Instructed to call doctor if having excessive drainage, fever, numbness or other problems. Instructed to continue wearing TEDs. I have reviewed discharge instructions with the patient and spouse. The patient and spouse verbalized understanding.

## 2017-05-25 NOTE — DISCHARGE INSTRUCTIONS
[address] [facility]   Patient Discharge Instructions    Luis Fernando Higginbotham / [id number] : 1965    Admitted 2017 Discharged: 2017     IF YOU HAVE ANY PROBLEMS ONCE YOU ARE AT HOME CALL THE FOLLOWING NUMBERS:   Main office number: [phone]      Medications    · The medications you are to continue on are listed on the medication reconciliation sheet. · Narcotic pain medications as well as supplemental iron can cause constipation. If this occurs try stopping the narcotic pain medication and/or the iron. · It is important that you take the medication exactly as they are prescribed. · Medications which increase your risk of blood clots are listed to stop for 5 weeks after surgery as well as medications or supplements which increase your risk of bleeding complications. · Keep your medication in the bottles provided by the pharmacist and keep a list of the medication names, dosages, and times to be taken in your wallet. · Do not take other medications without consulting your doctor. Important Information    Do NOT smoke as this will greatly increase your risk of infection! Resume your prehospital diet. If you have excessive nausea or vomitting call your doctor's office     Leg swelling and warmth is normal for 6 months after surgery. If you experience swelling in your leg elevate you leg while laying down with your toes above your heart. If you have sudden onset severe swelling with leg pain call our office. Use Nick Hose stockings until we see you in the office for your follow up appointment. The stitches deep inside take approximately 6 months to dissolve. There will be sharp shooting, stinging and burning pain. This is normal and will resolve between 3-6 months after surgery. Difficulty sleeping is normal following total Knee and Hip replacement. You may try melatonin, an over-the-counter sleep aid or benadryl to help with sleep.  Most patients will resume sleeping through the night 8 weeks after surgery. Home Physical Therapy is arranged. Home Health will contact you within 48 hrs of discharge that you have chosen. If you have not received a call within this time frame please contact that provider you chose. You should be given this information before you leave the hospital.     You are at a risk for falls. Use the rolling walker when walking. Patients who have had a joint replacement should not drive if they are still taking narcotic pain mediation during the daytime hours. Most patients wean themselves off of pain medication within 2-5 weeks after surgery. When to Call the office    - If you have a temperature greater then 101  - Uncontrolled vomiting   - Loose control of your bladder or bowel function  - Are unable to bear any weight   - Need a pain medication refill       DISCHARGE SUMMARY from Nurse    The following personal items collected during your admission are returned to you:   Dental Appliance: Dental Appliances: None  Vision: Visual Aid: Glasses  Hearing Aid:   na  Jewelry: Jewelry: None  Clothing:   self  Other Valuables: Other Valuables: Cell Phone, "[employer] 1922 (Ipad and all valuables with Shima)  Valuables sent to safe:   na    PATIENT INSTRUCTIONS:    After general anesthesia or intravenous sedation, for 24 hours or while taking prescription Narcotics:  · Limit your activities  · Do not drive and operate hazardous machinery  · Do not make important personal or business decisions  · Do  not drink alcoholic beverages  · If you have not urinated within 8 hours after discharge, please contact your surgeon on call.     Report the following to your surgeon:  · Excessive pain, swelling, redness or odor of or around the surgical area  · Temperature over 101  · Nausea and vomiting lasting longer than 4 hours or if unable to take medications  · Any signs of decreased circulation or nerve impairment to extremity: change in color, persistent  numbness, tingling, coldness or increase pain  · Any questions, call office @ [phone]      Keep scheduled follow up appointment. If need to change, call office @ [phone]. *  Please give a list of your current medications to your Primary Care Provider. *  Please update this list whenever your medications are discontinued, doses are      changed, or new medications (including over-the-counter products) are added. *  Please carry medication information at all times in case of emergency situations. Total Knee Replacement: What to Expect at [address] [place] [address]    When you leave the hospital, you should be able to move around with a walker or crutches. But you will need someone to help you at home for the next few weeks or until you have more energy and can move around better. If there is no one to help you at home, you may go to a rehabilitation center. You will go home with a bandage and stitches or staples. Change the bandage as your doctor tells you to. Your doctor will remove your stitches or staples 10 to 21 days after your surgery. You may still have some mild pain, and the area may be swollen for 3 to 6 months after surgery. Your knee will continue to improve for 6 to 12 months. You will probably use a walker for 1 to 3 weeks and then use crutches. When you are ready, you can use a cane. You will probably be able to walk on your own in 4 to 8 weeks. You will need to do months of physical rehabilitation (rehab) after a knee replacement. Rehab will help you strengthen the muscles of the knee and help you regain movement. After you recover, your artificial knee will allow you to do normal daily activities with less pain or no pain at all. You may be able to hike, dance, ride a bike, and play golf. Talk to your doctor about whether you can do more strenuous activities. Always tell your caregivers that you have an artificial knee.   How long it will take to walk on your own, return to normal activities, and go back to work depends on your health and how well your rehabilitation (rehab) program goes. The better you do with your rehab exercises, the quicker you will get your strength and movement back. This care sheet gives you a general idea about how long it will take for you to recover. But each person recovers at a different pace. Follow the steps below to get better as quickly as possible. How can you care for yourself at home? Activity  · Rest when you feel tired. You may take a nap, but do not stay in bed all day. When you sit, use a chair with arms. You can use the arms to help you stand up. · Work with your physical therapist to find the best way to exercise. You may be able to take frequent, short walks using crutches or a walker. What you can do as your knee heals will depend on whether your new knee is cemented or uncemented. You may not be able to do certain things for a while if your new knee is uncemented. · After your knee has healed enough, you can do more strenuous activities with caution. ¨ You can golf, but use a golf cart, and do not wear shoes with spikes. ¨ You can bike on a flat road or on a stationary bike. Avoid biking up hills. ¨ Your doctor may suggest that you stay away from activities that put stress on your knee. These include tennis or badminton, squash or racquetball, contact sports like football, jumping (such as in basketball), jogging, or running. ¨ Avoid activities where you might fall. These include horseback riding, skiing, and mountain biking. · Do not sit for more than 1 hour at a time. Get up and walk around for a while before you sit again. If you must sit for a long time, prop up your leg with a chair or footstool. This will help you avoid swelling. · Ask your doctor when you can shower. You may need to take sponge baths until your stitches or staples have been removed. · Ask your doctor when you can drive again.  It may take up to 8 weeks after knee replacement surgery before it is safe for you to drive. · When you get into a car, sit on the edge of the seat. Then pull in your legs, and turn to face the front. · You should be able to do many everyday activities 3 to 6 weeks after your surgery. You will probably need to take 4 to 16 weeks off from work. When you can go back to work depends on the type of work you do and how you feel. · Ask your doctor when it is okay for you to have sex. · Do not lift anything heavier than 10 pounds and do not lift weights for 12 weeks. Diet  · By the time you leave the hospital, you should be eating your normal diet. If your stomach is upset, try bland, low-fat foods like plain rice, broiled chicken, toast, and yogurt. Your doctor may suggest that you take iron and vitamin supplements. · Drink plenty of fluids (unless your doctor tells you not to). · Eat healthy foods, and watch your portion sizes. Try to stay at your ideal weight. Too much weight puts more stress on your new knee. · You may notice that your bowel movements are not regular right after your surgery. This is common. Try to avoid constipation and straining with bowel movements. You may want to take a fiber supplement every day. If you have not had a bowel movement after a couple of days, ask your doctor about taking a mild laxative. Medicines  · Your doctor will tell you if and when you can restart your medicines. He or she will also give you instructions about taking any new medicines. · If you take blood thinners, such as warfarin (Coumadin), clopidogrel (Plavix), or aspirin, be sure to talk to your doctor. He or she will tell you if and when to start taking those medicines again. Make sure that you understand exactly what your doctor wants you to do. · Your doctor may give you a blood-thinning medicine to prevent blood clots. If you take a blood thinner, be sure you get instructions about how to take your medicine safely.  Blood thinners can cause serious bleeding problems. This medicine could be in pill form or as a shot (injection). If a shot is necessary, your doctor will tell you how to do this. · Be safe with medicines. Take pain medicines exactly as directed. ¨ If the doctor gave you a prescription medicine for pain, take it as prescribed. ¨ If you are not taking a prescription pain medicine, ask your doctor if you can take an over-the-counter medicine. ¨ Plan to take your pain medicine 30 minutes before exercises. It is easier to prevent pain before it starts than to stop it once it has started. · If you think your pain medicine is making you sick to your stomach:  ¨ Take your medicine after meals (unless your doctor has told you not to). ¨ Ask your doctor for a different pain medicine. · If your doctor prescribed antibiotics, take them as directed. Do not stop taking them just because you feel better. You need to take the full course of antibiotics. Incision care  · You will have a bandage over the cut (incision) and staples or stitches. Take the bandage off when your doctor says it is okay. · Your doctor will remove the staples or stitches 10 days to 3 weeks after the surgery and replace them with strips of tape. Leave the tape on for a week or until it falls off. Exercise  · Your rehab program will give you a number of exercises to do to help you get back your knee's range of motion and strength. Always do them as your therapist tells you. Ice and elevation  · For pain and swelling, put ice or a cold pack on the area for 10 to 20 minutes at a time. Put a thin cloth between the ice and your skin. Other instructions  · Continue to wear your support stockings as your doctor says. These help to prevent blood clots. The length of time that you will have to wear them depends on your activity level and the amount of swelling. · Wear medical alert jewelry that says you may need antibiotics before any procedure, including dental work.  You can buy this at most drugstores. · You have metal pieces in your knee. These may set off some airport metal detectors. Carry a medical alert card that says you have an artificial joint, just in case. Follow-up care is a key part of your treatment and safety. Be sure to make and go to all appointments, and call your doctor if you are having problems. It's also a good idea to know your test results and keep a list of the medicines you take. When should you call for help? Call 911 anytime you think you may need emergency care. For example, call if:  · You passed out (lost consciousness). · You have severe trouble breathing. · You have sudden chest pain and shortness of breath, or you cough up blood. Call your doctor now or seek immediate medical care if:  · You have signs of infection, such as:  ¨ Increased pain, swelling, warmth, or redness. ¨ Red streaks leading from the incision. ¨ Pus draining from the incision. ¨ A fever. · You have signs of a blood clot, such as:  ¨ Pain in your calf, back of the knee, thigh, or groin. ¨ Redness and swelling in your leg or groin. · Your incision comes open and begins to bleed, or the bleeding increases. · You have pain that does not get better after you take pain medicine. Watch closely for changes in your health, and be sure to contact your doctor if:  · You do not have a bowel movement after taking a laxative. Where can you learn more? Go to http://bernard-ghada.info/. Enter N740 in the search box to learn more about \"Total Knee Replacement: What to Expect at Home. \"  Current as of: August 4, 2016  Content Version: 11.2  © 5802-3924 [employer]. Care instructions adapted under license by [employer] (which disclaims liability or warranty for this information).  If you have questions about a medical condition or this instruction, always ask your healthcare professional. [place] [address] any warranty or liability for your use of this information. These are general instructions for a healthy lifestyle:    No smoking/ No tobacco products/ Avoid exposure to second hand smoke    Surgeon General's Warning:  Quitting smoking now greatly reduces serious risk to your health. Obesity, smoking, and sedentary lifestyle greatly increases your risk for illness    A healthy diet, regular physical exercise & weight monitoring are important for maintaining a healthy lifestyle    You may be retaining fluid if you have a history of heart failure or if you experience any of the following symptoms:  Weight gain of 3 pounds or more overnight or 5 pounds in a week, increased swelling in our hands or feet or shortness of breath while lying flat in bed. Please call your doctor as soon as you notice any of these symptoms; do not wait until your next office visit. Recognize signs and symptoms of STROKE:    F-face looks uneven    A-arms unable to move or move even    S-speech slurred or non-existent    T-time-call 911 as soon as signs and symptoms begin-DO NOT go       Back to bed or wait to see if you get better-TIME IS BRAIN. The discharge information has been reviewed with the patient. The patient verbalized understanding. Information obtained by :  I understand that if any problems occur once I am at home I am to contact my physician. I understand and acknowledge receipt of the instructions indicated above.                                                                                                                                            Physician's or R.N.'s Signature                                                                  Date/Time                                                                                                                                              Patient or Representative Signature                                                          Date/Time

## 2017-05-25 NOTE — PROGRESS NOTES
Problem: Mobility Impaired (Adult and Pediatric)  Goal: *Acute Goals and Plan of Care (Insert Text)  GOALS (1-4 days):  (1.)Mr. Jc Soliman will move from supine to sit and sit to supine in bed with STAND BY ASSIST.  (2.)Mr. Jc Soliman will transfer from bed to chair and chair to bed with STAND BY ASSIST using the least restrictive device. (3.)Mr. Jc Soliman will ambulate with STAND BY ASSIST for 200 feet with the least restrictive device. (4.)Mr. Jc Soliman will ambulate up/down 3 steps with right railing with CONTACT GUARD ASSIST with no device. Bilateral railing 5/25/17  (5.)Mr. Jc Soliman will increase right knee ROM to 5°-80°.   ________________________________________________________________________________________________      PHYSICAL THERAPY JOINT CAMP TKA: Daily Note and PM 5/25/2017  INPATIENT: Hospital Day: 2  Payor: BLUE CROSS / [place] BLUE ESSENTIALS JERRY / Product Type: Paula Hin /      NAME/AGE/GENDER: Marixa Mott is a 47 y.o. male  PRIMARY DIAGNOSIS:  Primary osteoarthritis of one knee, right [M17.11]              Procedure(s) and Anesthesia Type:     * RIGHT KNEE ARTHROPLASTY TOTAL - Spinal (Right)  ICD-10: Treatment Diagnosis:        · Pain in Right Knee (M25.561)  · Stiffness of Right Knee, Not elsewhere classified (M25.661)  · Difficulty in walking, Not elsewhere classified (R26.2)  · Other abnormalities of gait and mobility (R26.89)       ASSESSMENT:      Mr. Jc Soliman presents with decreased strength and ROM R LE and limited functional mobility S/P R TKA. He will benefit from skilled therapy services to address the below problem list.   Patient ambulating well and able to negotiate stairs without difficulty. Performing LE exercises without physical assistance. Patient hoping to go home today. Patient safe with regards to mobility to go home but concerned about pain as block may still be effective. Patient also having some issues with bloody drainage - RN notified.   Patient to consider options with wife with regards to discharge this afternoon or tomorrow morning. This section established at most recent assessment   PROBLEM LIST (Impairments causing functional limitations):  1. Decreased Strength  2. Decreased ADL/Functional Activities  3. Decreased Transfer Abilities  4. Decreased Ambulation Ability/Technique  5. Decreased Flexibility/Joint Mobility  6. Decreased [place] with Home Exercise Program    INTERVENTIONS PLANNED: (Benefits and precautions of physical therapy have been discussed with the patient.)  1. Bed Mobility  2. Gait Training  3. Home Exercise Program (HEP)  4. Therapeutic Exercise/Strengthening  5. Transfer Training  6. Range of Motion: active/assisted/passive  7. Therapeutic Activities  8. Group Therapy      TREATMENT PLAN: Frequency/Duration: Follow patient BID   to address above goals. Rehabilitation Potential For Stated Goals: GOOD      RECOMMENDED REHABILITATION/EQUIPMENT: (at time of discharge pending progress): Continue Skilled Therapy and Home Health: Physical Therapy. HISTORY:   History of Present Injury/Illness (Reason for Referral):  S/P R TKA  Past Medical History/Comorbidities:   Mr. Roberto Mclean  has a past medical history of Abnormal hemoglobin (Hgb) ([facility] [address] (05/02/2017); Anxiety; Arrhythmia; Arthritis; CAD (coronary artery disease); Chronic pain; H/O echocardiogram (11/02/2015); History of MI (myocardial infarction) (2001); Hypertension; Morbid obesity ([facility] [address] (06/03/2016); Nausea & vomiting; Status post total right knee replacement (5/24/2017); and Thyroid disease. He also has no past medical history of Adverse effect of anesthesia; Difficult intubation; Malignant hyperthermia due to anesthesia; or Pseudocholinesterase deficiency. Mr. Roberto Mclean  has a past surgical history that includes orthopaedic (Right); tonsillectomy; knee arthroscopy (Left, 06/2016); and colonoscopy.   Social History/Living Environment:   Home Environment: Private residence  # Steps to Enter: 2  Rails to Enter: No  One/Two Story Residence: One story  Living Alone: No  Support Systems: Spouse/Significant Other/Partner  Patient Expects to be Discharged to[de-identified] Private residence  Current DME Used/Available at Home: Leocadia Breech, quad, Walker, rolling  Tub or Shower Type: Shower  Prior Level of Function/Work/Activity:  Independent with gait and ADLs. Number of Personal Factors/Comorbidities that affect the Plan of Care: 0: LOW COMPLEXITY   EXAMINATION:   Most Recent Physical Functioning:      Gross Assessment  AROM: Within functional limits (except R knee; S/p R TKA)  Strength: Within functional limits (except R LE; S/P R TKA)         RLE AROM  R Knee Flexion: 71  R Knee Extension: 14        RLE Strength  R Hip Flexion: 3+  R Knee Extension: 3     Bed Mobility  Supine to Sit: Minimum assistance  Sit to Supine:  (left up in chair)  Scooting: Contact guard assistance     Transfers  Sit to Stand: Stand-by asssistance  Stand to Sit: Stand-by asssistance  Bed to Chair: Minimum assistance     Balance  Sitting: Intact  Standing: With support    Posture  Posture Assessment: Forward head;Rounded shoulders           Weight Bearing Status  Right Side Weight Bearing: As tolerated  Distance (ft): 142 Feet (ft) (x 2)  Ambulation - Level of Assistance: Stand-by asssistance  Assistive Device: Walker, rolling  Speed/Nicolette: Pace decreased (<100 feet/min)  Step Length: Left shortened;Right shortened  Stance: Right decreased  Gait Abnormalities: Antalgic  Number of Stairs Trained: 3  Stairs - Level of Assistance: Stand-by asssistance  Rail Use: Both  Interventions: Safety awareness training;Verbal cues      Braces/Orthotics: none     Right Knee Cold  Type: Cryocuff       Body Structures Involved:  1. Bones  2. Joints  3. Muscles Body Functions Affected:  1. Neuromusculoskeletal  2. Movement Related Activities and Participation Affected:  1. General Tasks and Demands  2. Mobility  3.  Self Care Number of elements that affect the Plan of Care: 1-2: LOW COMPLEXITY   CLINICAL PRESENTATION:   Presentation: Stable and uncomplicated: LOW COMPLEXITY   CLINICAL DECISION MAKIN [facility] [address] [address] AM-PAC 6 Clicks   Basic Mobility Inpatient Short Form  How much difficulty does the patient currently have. .. Unable A Lot A Little None   1. Turning over in bed (including adjusting bedclothes, sheets and blankets)? [ ] 1   [ ] 2   [ ] 3   [X] 4   2. Sitting down on and standing up from a chair with arms ( e.g., wheelchair, bedside commode, etc.)   [ ] 1   [ ] 2   [X] 3   [ ] 4   3. Moving from lying on back to sitting on the side of the bed? [ ] 1   [ ] 2   [X] 3   [ ] 4   How much help from another person does the patient currently need. .. Total A Lot A Little None   4. Moving to and from a bed to a chair (including a wheelchair)? [ ] 1   [ ] 2   [X] 3   [ ] 4   5. Need to walk in hospital room? [ ] 1   [ ] 2   [X] 3   [ ] 4   6. Climbing 3-5 steps with a railing? [ ] 1   [ ] 2   [X] 3   [ ] 4   © , Trustees of [address] [facility] [address] [address], under license to [employer]. All rights reserved       Score:  Initial: 19 Most Recent: X (Date: -- )     Interpretation of Tool:  Represents activities that are increasingly more difficult (i.e. Bed mobility, Transfers, Gait). Score 24 23 22-20 19-15 14-10 9-7 6       Modifier CH CI CJ CK CL CM CN         · Mobility - Walking and Moving Around:               - CURRENT STATUS:    CK - 40%-59% impaired, limited or restricted               - GOAL STATUS:           CJ - 20%-39% impaired, limited or restricted               - D/C STATUS:                       ---------------To be determined---------------  Payor: BLUE CROSS / [place] JERRY / Product Type: JERRY /       Medical Necessity:     · Patient demonstrates good rehab potential due to higher previous functional level.   Reason for Services/Other Comments:  · Patient continues to require present interventions due to patient's inability to perform exercises and functional mobility independently. Use of outcome tool(s) and clinical judgement create a POC that gives a: Clear prediction of patient's progress: LOW COMPLEXITY                 TREATMENT:   (In addition to Assessment/Re-Assessment sessions the following treatments were rendered)      Pre-treatment Symptoms/Complaints:  Patient with no complaints. Reports his bandage was changed prior to therapy. Pain: Initial:   Pain Intensity 1: 0  Post Session:  0      Gait Training (15 Minutes):  Gait training to improve and/or restore physical functioning as related to mobility, balance and coordination. Ambulated 142 Feet (ft) (x 2) with Stand-by asssistance using a Walker, rolling and minimal Safety awareness training;Verbal cues related to their stance phase and stride length to promote proper body alignment. Therapeutic Exercise: (45 Minutes (group)):  Exercises per grid below to improve mobility and strength. Required minimal verbal cues to perform exercises correctly. Progressed range and repetitions as indicated. Date:  5/25/17 Date:    Date:      ACTIVITY/EXERCISE AM PM AM PM AM PM   GROUP THERAPY  [ ]  [X]  [ ]  [ ]  [ ]  [ ]   Ankle Pumps 10  15           Quad Sets 10  15           Gluteal Sets 10  15           Hip ABd/ADduction 10  15           Straight Leg Raises 10  15           Knee Slides 10  15           Short Arc Quads    15           Long Arc Quads               Chair Slides    15                           B = bilateral; AA = active assistive; A = active; P = passive       Treatment/Session Assessment:         Response to Treatment:  Patient tolerated session very well. Hoping to discharge home today. Some blood drainage after therapy - RN notified.      Education:  [X] Home Exercises  [X] Fall Precautions  [ ] Hip Precautions [ ] Going Home Video  [X] Knee/Hip Prosthesis Review  [X] Aurea Watts Management/Safety [ ] Adaptive Equipment as Needed         Interdisciplinary Collaboration:   · Physical Therapist, Physical Therapy Assistant, Registered Nurse and Rehabilitation Attendant     After treatment position/precautions:   · Up in chair  · Bed/Chair-wheels locked  · Call light within reach  · RN notified  · Family at bedside     Compliance with Program/Exercises: compliant all of the time. Recommendations/Intent for next treatment session:  Treatment next visit will focus on increasing Mr. Luisa Dos Santos independence with bed mobility, transfers, gait training, strength/ROM exercises, modalities for pain, and patient education.        Total Treatment Duration:  PT Patient Time In/Time Out  Time In: 1300  Time Out: 300 [facility],

## 2017-05-25 NOTE — PROGRESS NOTES
Aquacel bandage to knee leaking bloody drainage, removed. Incision intact with staples, redressed with aquacel. Pt going to group therapy.

## 2017-05-25 NOTE — PROGRESS NOTES
May 25, 2017         Post Op day: 1 Day Post-Op Procedure(s) (LRB):  RIGHT KNEE ARTHROPLASTY TOTAL (Right)      Admit Date: 2017  Admit Diagnosis: Primary osteoarthritis of one knee, right [M17.11]       Principle Problem: Status post total right knee replacement. Subjective: Doing well, No complaints, No SOB, No Chest Pain, No Nausea or Vomiting     Objective:   Vital Signs are Stable, No Acute Distress, Alert and Oriented, Dressing is Dry,  Neurovascular exam is normal.     Assessment / Plan :  Patient Active Problem List   Diagnosis Code    Status post total right knee replacement Z96.651    Patient Vitals for the past 8 hrs:   BP Temp Pulse Resp SpO2   17 0434 115/62 96.3 °F (35.7 °C) 96 18 98 %   17 0001 118/54 97 °F (36.1 °C) 98 18 98 %    Temp (24hrs), Av.9 °F (36.1 °C), Min:96.3 °F (35.7 °C), Max:97.4 °F (36.3 °C)    Body mass index is 36.18 kg/(m^2).     Lab Results   Component Value Date/Time    HGB 15.6 2017 05:39 AM      Pt seen by and discussed with Supervising Physician   Continue PT  Dc home today or tomorrow       Signed By: CHEPE Covington  2017,  7:38 AM

## 2017-05-25 NOTE — PROGRESS NOTES
Problem: Self Care Deficits Care Plan (Adult)  Goal: *Acute Goals and Plan of Care (Insert Text)  GOALS:   DISCHARGE GOALS (in preparation for going home/rehab): 3 days  1. Mr. Laci Mendiola will perform one lower body dressing activity with minimal assistance required to demonstrate improved functional mobility and safety. 2. Mr. Laci Mendiola will perform one lower body bathing activity with minimal assistance required to demonstrate improved functional mobility and safety. 3. Mr. Laci Mendiola will perform toileting/toilet transfer with contact guard assistance to demonstrate improved functional mobility and safety. 4. Mr. Laci Mendiola will perform shower transfer with contact guard assistance to demonstrate improved functional mobility and safety. JOINT CAMP OCCUPATIONAL THERAPY TKA: Initial Assessment and AM 5/25/2017  INPATIENT: Hospital Day: 2  Payor: BLUE CROSS / [place] BLUE E[place]ENTIAL[place] JERRY / Product Type: Carissa Calixto /      NAME/AGE/GENDER: Daniel Chen is a 47 y.o. male  PRIMARY DIAGNOSIS:  Primary osteoarthritis of one knee, right [M17.11]              Procedure(s) and Anesthesia Type:     * RIGHT KNEE ARTHROPLASTY TOTAL - Spinal (Right)  ICD-10: Treatment Diagnosis:        · Pain in Right Knee (M25.561)  · Stiffness of Right Knee, Not elsewhere classified (M25.661)  · Other lack of cordination (R27.8)       ASSESSMENT:      Mr. Laci Mendiola is s/p right TKA and presents with decreased weight bearing on right LE and decreased independence with functional mobility and activities of daily living. Patient would benefit from skilled Occupational Therapy to maximize independence and safety with self-care task and functional mobility. Pt would also benefit from education on adaptive equipment and safety precautions in preparation for going home or for recommendations for post-hospital rehab program.  Patient plans for further rehab at home with home health services and good family support.   OT reviewed therapy schedule and plan of care with patient. Patient was able to transfer and preform self care skills as charted below. Patient instructed to call for assistance when needing to get up from the recliner and all needs in reach. Patient verbalized understanding of call light. This section established at most recent assessment   PROBLEM LIST (Impairments causing functional limitations):  1. Decreased Strength  2. Decreased ADL/Functional Activities  3. Decreased Transfer Abilities  4. Increased Pain  5. Increased Fatigue  6. Decreased Flexibility/Joint Mobility  7. Decreased Knowledge of Precautions    INTERVENTIONS PLANNED: (Benefits and precautions of occupational therapy have been discussed with the patient.)  1. Activities of daily living training  2. Adaptive equipment training  3. Balance training  4. Clothing management  5. Donning&doffing training  6. Theraputic activity      TREATMENT PLAN: Frequency/Duration: Follow patient 1time to address above goals. Rehabilitation Potential For Stated Goals: GOOD      RECOMMENDED REHABILITATION/EQUIPMENT: (at time of discharge pending progress): Continue Skilled Therapy and Home Health: Physical Therapy. OCCUPATIONAL PROFILE AND HISTORY:   History of Present Injury/Illness (Reason for Referral): Pt presents this date s/p (right) TKA. Past Medical History/Comorbidities:   Mr. Remigio Gramajo  has a past medical history of Abnormal hemoglobin (Hgb) ([facility] [address] (05/02/2017); Anxiety; Arrhythmia; Arthritis; CAD (coronary artery disease); Chronic pain; H/O echocardiogram (11/02/2015); History of MI (myocardial infarction) (2001); Hypertension; Morbid obesity ([facility] [address] (06/03/2016); Nausea & vomiting; Status post total right knee replacement (5/24/2017); and Thyroid disease. He also has no past medical history of Adverse effect of anesthesia; Difficult intubation; Malignant hyperthermia due to anesthesia; or Pseudocholinesterase deficiency.   Mr. Remigio Gramajo  has a past surgical history that includes orthopaedic (Right); tonsillectomy; knee arthroscopy (Left, 06/2016); and colonoscopy. Social History/Living Environment:   Home Environment: Private residence  # Steps to Enter: 2  Rails to Enter: No  One/Two Story Residence: One story  Living Alone: No  Support Systems: Spouse/Significant Other/Partner  Patient Expects to be Discharged to[de-identified] Private residence  Current DME Used/Available at Home: Leocadia Breech, quad, Walker, rolling  Tub or Shower Type: Shower  Prior Level of Function/Work/Activity:  Mod I with ADLs prior      Number of Personal Factors/Comorbidities that affect the Plan of Care: Brief history (0):  LOW COMPLEXITY   ASSESSMENT OF OCCUPATIONAL PERFORMANCE[de-identified]   Most Recent Physical Functioning:            Patient Vitals for the past 6 hrs:       BP BP Patient Position SpO2 O2 Flow Rate (L/min) Pulse   05/25/17 0434 115/62 - 98 % - 96   05/25/17 0752 147/59 At rest 95 % - 91   05/25/17 0846 - - 97 % 2 l/min -                       Coordination  Fine Motor Skills-Upper: Left Intact; Right Intact  Gross Motor Skills-Upper: Left Intact; Right Intact           Mental Status  Neurologic State: Alert; Appropriate for age  Orientation Level: Appropriate for age  Cognition: Appropriate decision making; Appropriate for age attention/concentration; Appropriate safety awareness; Follows commands  Perception: Appears intact  Perseveration: No perseveration noted  Safety/Judgement: Awareness of environment; Fall prevention                    Basic ADLs (From Assessment) Complex ADLs (From Assessment)   Basic ADL  Feeding: Setup  Oral Facial Hygiene/Grooming: Supervision  Bathing: Moderate assistance  Upper Body Dressing: Supervision  Lower Body Dressing: Moderate assistance  Toileting: Minimum assistance     Grooming/Bathing/Dressing Activities of Daily Living     Cognitive Retraining  Safety/Judgement: Awareness of environment; Fall prevention                 Functional Transfers  Toilet Transfer : Minimum assistance  Shower Transfer: Minimum assistance     Bed/Mat Mobility  Supine to Sit: Minimum assistance  Sit to Stand: Minimum assistance  Bed to Chair: Minimum assistance  Scooting: Contact guard assistance           Physical Skills Involved:  1. Range of Motion  2. Balance  3. Mobility Cognitive Skills Affected (resulting in the inability to perform in a timely and safe manner): 1. none Psychosocial Skills Affected:  1. Habits   Number of elements that affect the Plan of Care: 3-5:  MODERATE COMPLEXITY   CLINICAL DECISION MAKING:   [facility] AM-PAC 6 Clicks   Basic Mobility Inpatient Short Form  How much help from another person does the patient currently need. .. Total A Lot A Little None   1. Putting on and taking off regular lower body clothing?   [ ] 1   [X] 2   [ ] 3   [ ] 4   2. Bathing (including washing, rinsing, drying)? [ ] 1   [X] 2   [ ] 3   [ ] 4   3. Toileting, which includes using toilet, bedpan or urinal?   [ ] 1   [ ] 2   [X] 3   [ ] 4   4. Putting on and taking off regular upper body clothing?   [ ] 1   [ ] 2   [X] 3   [ ] 4   5. Taking care of personal grooming such as brushing teeth? [ ] 1   [ ] 2   [X] 3   [ ] 4   6. Eating meals? [ ] 1   [ ] 2   [ ] 3   [X] 4   © 2007, Trustees of [facility], under license to [employer]. All rights reserved   Score:  Initial: 17 Most Recent: X (Date: -- )     Interpretation of Tool:  Represents activities that are increasingly more difficult (i.e. Bed mobility, Transfers, Gait).        Score 24 23 22-20 19-15 14-10 9-7 6       Modifier CH CI CJ CK CL CM CN         · Self Care:               - CURRENT STATUS:    CK - 40%-59% impaired, limited or restricted               - GOAL STATUS:           CJ - 20%-39% impaired, limited or restricted               - D/C STATUS:                       ---------------To be determined---------------  Payor: REJI LIN / Plan: [address] [place] [address] / Product Type: JERRY /       Medical Necessity:     · Patient is expected to demonstrate progress in balance, coordination and functional technique to decrease assistance required with self care and functional mobility and improve safety during self care and functional mobility. Reason for Services/Other Comments:  · Patient continues to require skilled intervention due to decreased self care and functional mobility. Use of outcome tool(s) and clinical judgement create a POC that gives a: LOW COMPLEXITY                 TREATMENT:   (In addition to Assessment/Re-Assessment sessions the following treatments were rendered)      Pre-treatment Symptoms/Complaints:  Complain of knee pain at rest  Pain: Initial:   Pain Intensity 1: 5  Pain Location 1: Knee  Pain Orientation 1: Right  Pain Intervention(s) 1: Ambulation/Increased Activity  Post Session:  3/10 resting in recliner, declined iceman      Assessment/Reassessment only, no treatment provided today     Treatment/Session Assessment:         Response to Treatment:  Tolerated well, may go home today. Education:  [ ] Home Exercises  [X] Fall Precautions  [ ] Hip Precautions [ ] Going Home Video  [X] Knee/Hip Prosthesis Review  [X] Walker Management/Safety [X] Adaptive Equipment as Needed         Interdisciplinary Collaboration:   · Occupational Therapist  · Registered Nurse     After treatment position/precautions:   · Up in chair  · Bed/Chair-wheels locked  · Call light within reach  · RN notified  · Family at bedside     Compliance with Program/Exercises: compliant all of the time. Recommendations/Intent for next treatment session:  Treatment next visit will focus on increasing Mr. Luisa Dos Santos independence with  self care and functional mobility modalities for pain, and patient education.        Total Treatment Duration:  OT Patient Time In/Time Out  Time In: 0850  Time Out: 2755 Forrest Simmons, OT

## 2017-05-25 NOTE — PROGRESS NOTES
Dressed and in recliner. Aquacel to R knee with large amount bloody drainage to top half. TEDs on. Good movement and sensation to LEs. No complaints. Wife at bedside.

## 2019-02-11 ENCOUNTER — APPOINTMENT (RX ONLY)
Dept: URBAN - METROPOLITAN AREA CLINIC 23 | Facility: CLINIC | Age: 54
Setting detail: DERMATOLOGY
End: 2019-02-11

## 2019-02-11 DIAGNOSIS — Z71.89 OTHER SPECIFIED COUNSELING: ICD-10-CM

## 2019-02-11 DIAGNOSIS — L57.8 OTHER SKIN CHANGES DUE TO CHRONIC EXPOSURE TO NONIONIZING RADIATION: ICD-10-CM

## 2019-02-11 DIAGNOSIS — L57.0 ACTINIC KERATOSIS: ICD-10-CM

## 2019-02-11 DIAGNOSIS — L91.8 OTHER HYPERTROPHIC DISORDERS OF THE SKIN: ICD-10-CM

## 2019-02-11 DIAGNOSIS — L73.8 OTHER SPECIFIED FOLLICULAR DISORDERS: ICD-10-CM
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
## 2019-02-11 DIAGNOSIS — L82.1 OTHER SEBORRHEIC KERATOSIS: ICD-10-CM
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
## 2019-02-11 PROCEDURE — 99203 OFFICE O/P NEW LOW 30 MIN: CPT | Mod: 25

## 2019-02-11 PROCEDURE — ? SKIN TAG REMOVAL MULTI

## 2019-02-11 PROCEDURE — 17000 DESTRUCT PREMALG LESION: CPT

## 2019-02-11 PROCEDURE — ? LIQUID NITROGEN

## 2019-02-11 PROCEDURE — ? COUNSELING

## 2019-02-11 PROCEDURE — 17003 DESTRUCT PREMALG LES 2-14: CPT

## 2019-02-11 PROCEDURE — 11200 RMVL SKIN TAGS UP TO&INC 15: CPT | Mod: 59

## 2019-02-11 ASSESSMENT — LOCATION DETAILED DESCRIPTION DERM
LOCATION DETAILED: RIGHT SUPERIOR UPPER BACK
LOCATION DETAILED: LEFT MEDIAL SUPERIOR CHEST
LOCATION DETAILED: RIGHT DISTAL DORSAL FOREARM
LOCATION DETAILED: RIGHT CLAVICULAR NECK
LOCATION DETAILED: LEFT CENTRAL MALAR CHEEK
LOCATION DETAILED: LEFT POSTERIOR SHOULDER
LOCATION DETAILED: RIGHT CENTRAL MALAR CHEEK
LOCATION DETAILED: LEFT MEDIAL ZYGOMA
LOCATION DETAILED: LEFT LATERAL UPPER BACK
LOCATION DETAILED: LEFT CLAVICULAR NECK
LOCATION DETAILED: LEFT CLAVICULAR SKIN
LOCATION DETAILED: RIGHT SUPERIOR ANTERIOR NECK

## 2019-02-11 ASSESSMENT — LOCATION SIMPLE DESCRIPTION DERM
LOCATION SIMPLE: LEFT SHOULDER
LOCATION SIMPLE: LEFT CHEEK
LOCATION SIMPLE: RIGHT FOREARM
LOCATION SIMPLE: LEFT ANTERIOR NECK
LOCATION SIMPLE: LEFT UPPER BACK
LOCATION SIMPLE: LEFT ZYGOMA
LOCATION SIMPLE: LEFT CLAVICULAR SKIN
LOCATION SIMPLE: CHEST
LOCATION SIMPLE: RIGHT CHEEK
LOCATION SIMPLE: RIGHT ANTERIOR NECK
LOCATION SIMPLE: RIGHT UPPER BACK

## 2019-02-11 ASSESSMENT — LOCATION ZONE DERM
LOCATION ZONE: TRUNK
LOCATION ZONE: FACE
LOCATION ZONE: ARM
LOCATION ZONE: NECK

## 2019-02-11 NOTE — PROCEDURE: SKIN TAG REMOVAL MULTI
Medical Necessity Clause: This procedure was medically necessary because the lesions that were treated were: painful and irritated
Anesthesia Volume In Cc: 3
Consent: Written consent obtained and the risks of skin tag removal was reviewed with the patient including but not limited to bleeding, pigmentary change, infection, pain, and remote possibility of scarring.
Anesthesia Type: 1% lidocaine with epinephrine
Medical Necessity Information: It is in your best interest to select a reason for this procedure from the list below. All of these items fulfill various CMS LCD requirements except the new and changing color options.
Add Associated Diagnoses If Applicable When Selecting Medical Necessity: Yes
Include Z78.9 (Other Specified Conditions Influencing Health Status) As An Associated Diagnosis?: No
Total Number Of Lesions Treated: 1
Detail Level: Detailed

## 2020-03-10 ENCOUNTER — APPOINTMENT (RX ONLY)
Dept: URBAN - METROPOLITAN AREA CLINIC 23 | Facility: CLINIC | Age: 55
Setting detail: DERMATOLOGY
End: 2020-03-10

## 2020-03-10 DIAGNOSIS — D22 MELANOCYTIC NEVI: ICD-10-CM

## 2020-03-10 DIAGNOSIS — L57.8 OTHER SKIN CHANGES DUE TO CHRONIC EXPOSURE TO NONIONIZING RADIATION: ICD-10-CM

## 2020-03-10 DIAGNOSIS — Z71.89 OTHER SPECIFIED COUNSELING: ICD-10-CM

## 2020-03-10 DIAGNOSIS — L72.0 EPIDERMAL CYST: ICD-10-CM

## 2020-03-10 DIAGNOSIS — L82.1 OTHER SEBORRHEIC KERATOSIS: ICD-10-CM

## 2020-03-10 DIAGNOSIS — L73.8 OTHER SPECIFIED FOLLICULAR DISORDERS: ICD-10-CM

## 2020-03-10 DIAGNOSIS — L30.9 DERMATITIS, UNSPECIFIED: ICD-10-CM

## 2020-03-10 PROBLEM — D22.62 MELANOCYTIC NEVI OF LEFT UPPER LIMB, INCLUDING SHOULDER: Status: ACTIVE | Noted: 2020-03-10

## 2020-03-10 PROCEDURE — 99214 OFFICE O/P EST MOD 30 MIN: CPT | Mod: 25

## 2020-03-10 PROCEDURE — ? COUNSELING

## 2020-03-10 PROCEDURE — 11300 SHAVE SKIN LESION 0.5 CM/<: CPT

## 2020-03-10 PROCEDURE — ? TREATMENT REGIMEN

## 2020-03-10 PROCEDURE — ? SHAVE REMOVAL

## 2020-03-10 PROCEDURE — ? PRESCRIPTION

## 2020-03-10 RX ORDER — CLOBETASOL PROPIONATE 0.5 MG/G
CREAM TOPICAL
Qty: 1 | Refills: 5 | Status: ERX | COMMUNITY
Start: 2020-03-10

## 2020-03-10 RX ADMIN — CLOBETASOL PROPIONATE: 0.5 CREAM TOPICAL at 00:00

## 2020-03-10 ASSESSMENT — LOCATION ZONE DERM
LOCATION ZONE: ARM
LOCATION ZONE: LEG
LOCATION ZONE: FACE
LOCATION ZONE: TRUNK

## 2020-03-10 ASSESSMENT — LOCATION SIMPLE DESCRIPTION DERM
LOCATION SIMPLE: RIGHT CHEEK
LOCATION SIMPLE: LEFT SHOULDER
LOCATION SIMPLE: RIGHT UPPER BACK
LOCATION SIMPLE: LEFT UPPER ARM
LOCATION SIMPLE: RIGHT PRETIBIAL REGION
LOCATION SIMPLE: LEFT ELBOW
LOCATION SIMPLE: LEFT UPPER BACK
LOCATION SIMPLE: LEFT FOREARM
LOCATION SIMPLE: LEFT CHEEK
LOCATION SIMPLE: RIGHT ELBOW

## 2020-03-10 ASSESSMENT — LOCATION DETAILED DESCRIPTION DERM
LOCATION DETAILED: RIGHT ELBOW
LOCATION DETAILED: LEFT CENTRAL MALAR CHEEK
LOCATION DETAILED: LEFT PROXIMAL DORSAL FOREARM
LOCATION DETAILED: LEFT LATERAL UPPER BACK
LOCATION DETAILED: LEFT ANTERIOR MEDIAL PROXIMAL UPPER ARM
LOCATION DETAILED: LEFT SUPERIOR MEDIAL MALAR CHEEK
LOCATION DETAILED: RIGHT SUPERIOR UPPER BACK
LOCATION DETAILED: LEFT ELBOW
LOCATION DETAILED: RIGHT CENTRAL MALAR CHEEK
LOCATION DETAILED: RIGHT MEDIAL PROXIMAL PRETIBIAL REGION
LOCATION DETAILED: LEFT POSTERIOR SHOULDER

## 2020-03-10 NOTE — PROCEDURE: SHAVE REMOVAL
Billing Type: Third-Party Bill
Bill 57786 For Specimen Handling/Conveyance To Laboratory?: no
Notification Instructions: Patient will be notified of biopsy results. However, patient instructed to call the office if not contacted within 2 weeks.
Detail Level: Detailed
Consent was obtained from the patient. The risks and benefits to therapy were discussed in detail. Specifically, the risks of infection, scarring, bleeding, prolonged wound healing, incomplete removal, allergy to anesthesia, nerve injury and recurrence were addressed. Prior to the procedure, the treatment site was clearly identified and confirmed by the patient. All components of Universal Protocol/PAUSE Rule completed.
Path Notes (To The Dermatopathologist): Please check margins.
Anesthesia Type: 1% lidocaine without epinephrine and a 1:10 solution of 8.4% sodium bicarbonate
Post-Care Instructions: I reviewed with the patient in detail post-care instructions. Patient is to keep the biopsy site dry overnight, and then apply bacitracin twice daily until healed. Patient may apply hydrogen peroxide soaks to remove any crusting.
Medical Necessity Clause: This procedure was medically necessary because the lesion that was treated was:
Wound Care: Vaseline
Size Of Lesion In Cm (Required): 0.2
Was A Bandage Applied: Yes
Medical Necessity Information: It is in your best interest to select a reason for this procedure from the list below. All of these items fulfill various CMS LCD requirements except the new and changing color options.
Biopsy Method: double edge Personna blade
Hemostasis: Aluminum Chloride
Accession #: [id number]
Anesthesia Volume In Cc: 0.5
X Size Of Lesion In Cm (Optional): 0

## 2020-09-22 ENCOUNTER — APPOINTMENT (RX ONLY)
Dept: URBAN - METROPOLITAN AREA CLINIC 23 | Facility: CLINIC | Age: 55
Setting detail: DERMATOLOGY
End: 2020-09-22

## 2020-09-22 DIAGNOSIS — D18.0 HEMANGIOMA: ICD-10-CM

## 2020-09-22 DIAGNOSIS — Z87.2 PERSONAL HISTORY OF DISEASES OF THE SKIN AND SUBCUTANEOUS TISSUE: ICD-10-CM

## 2020-09-22 DIAGNOSIS — Z71.89 OTHER SPECIFIED COUNSELING: ICD-10-CM

## 2020-09-22 DIAGNOSIS — L82.1 OTHER SEBORRHEIC KERATOSIS: ICD-10-CM

## 2020-09-22 PROBLEM — D18.01 HEMANGIOMA OF SKIN AND SUBCUTANEOUS TISSUE: Status: ACTIVE | Noted: 2020-09-22

## 2020-09-22 PROCEDURE — ? COUNSELING

## 2020-09-22 PROCEDURE — 99213 OFFICE O/P EST LOW 20 MIN: CPT

## 2020-09-22 ASSESSMENT — LOCATION DETAILED DESCRIPTION DERM
LOCATION DETAILED: RIGHT PROXIMAL PRETIBIAL REGION
LOCATION DETAILED: RIGHT SUPERIOR MEDIAL MIDBACK
LOCATION DETAILED: STERNUM
LOCATION DETAILED: LEFT ANTERIOR MEDIAL PROXIMAL UPPER ARM
LOCATION DETAILED: LEFT ANTERIOR PROXIMAL THIGH

## 2020-09-22 ASSESSMENT — LOCATION ZONE DERM
LOCATION ZONE: TRUNK
LOCATION ZONE: LEG
LOCATION ZONE: ARM

## 2020-09-22 ASSESSMENT — LOCATION SIMPLE DESCRIPTION DERM
LOCATION SIMPLE: LEFT UPPER ARM
LOCATION SIMPLE: LEFT THIGH
LOCATION SIMPLE: CHEST
LOCATION SIMPLE: RIGHT PRETIBIAL REGION
LOCATION SIMPLE: RIGHT LOWER BACK

## 2022-04-19 ENCOUNTER — APPOINTMENT (RX ONLY)
Dept: URBAN - METROPOLITAN AREA CLINIC 23 | Facility: CLINIC | Age: 57
Setting detail: DERMATOLOGY
End: 2022-04-19

## 2022-04-19 DIAGNOSIS — Z87.2 PERSONAL HISTORY OF DISEASES OF THE SKIN AND SUBCUTANEOUS TISSUE: ICD-10-CM

## 2022-04-19 DIAGNOSIS — L57.8 OTHER SKIN CHANGES DUE TO CHRONIC EXPOSURE TO NONIONIZING RADIATION: ICD-10-CM

## 2022-04-19 DIAGNOSIS — D18.0 HEMANGIOMA: ICD-10-CM

## 2022-04-19 DIAGNOSIS — L82.1 OTHER SEBORRHEIC KERATOSIS: ICD-10-CM

## 2022-04-19 DIAGNOSIS — L73.8 OTHER SPECIFIED FOLLICULAR DISORDERS: ICD-10-CM

## 2022-04-19 PROBLEM — D18.01 HEMANGIOMA OF SKIN AND SUBCUTANEOUS TISSUE: Status: ACTIVE | Noted: 2022-04-19

## 2022-04-19 PROCEDURE — 99213 OFFICE O/P EST LOW 20 MIN: CPT

## 2022-04-19 PROCEDURE — ? COUNSELING

## 2022-04-19 ASSESSMENT — LOCATION ZONE DERM
LOCATION ZONE: ARM
LOCATION ZONE: TRUNK
LOCATION ZONE: NOSE
LOCATION ZONE: FACE
LOCATION ZONE: LEG

## 2022-04-19 ASSESSMENT — LOCATION SIMPLE DESCRIPTION DERM
LOCATION SIMPLE: RIGHT PRETIBIAL REGION
LOCATION SIMPLE: LEFT SHOULDER
LOCATION SIMPLE: LEFT THIGH
LOCATION SIMPLE: LEFT UPPER ARM
LOCATION SIMPLE: RIGHT SHOULDER
LOCATION SIMPLE: ABDOMEN
LOCATION SIMPLE: CHEST
LOCATION SIMPLE: RIGHT CHEEK
LOCATION SIMPLE: NOSE

## 2022-04-19 ASSESSMENT — LOCATION DETAILED DESCRIPTION DERM
LOCATION DETAILED: RIGHT CENTRAL MALAR CHEEK
LOCATION DETAILED: EPIGASTRIC SKIN
LOCATION DETAILED: NASAL DORSUM
LOCATION DETAILED: LEFT ANTERIOR MEDIAL PROXIMAL UPPER ARM
LOCATION DETAILED: LEFT ANTERIOR DISTAL THIGH
LOCATION DETAILED: RIGHT POSTERIOR SHOULDER
LOCATION DETAILED: LEFT POSTERIOR SHOULDER
LOCATION DETAILED: RIGHT PROXIMAL PRETIBIAL REGION
LOCATION DETAILED: STERNUM

## 2022-04-19 NOTE — HPI: FULL BODY SKIN EXAMINATION
What Type Of Note Output Would You Prefer (Optional)?: Standard Output
What Is The Reason For Today's Visit?: Full Body Skin Examination
What Is The Reason For Today's Visit? (Being Monitored For X): surveillance against the recurrence of atypical nevi
How Severe Are Your Spot(S)?: mild
Additional History: Pt gives verbal consent for biopsy. JANY Middleton

## 2022-05-11 PROBLEM — E78.2 MIXED HYPERLIPIDEMIA: Status: ACTIVE | Noted: 2017-10-24

## 2022-05-11 PROBLEM — E03.9 ACQUIRED HYPOTHYROIDISM: Status: ACTIVE | Noted: 2017-10-24

## 2022-05-11 PROBLEM — I48.0 PAROXYSMAL ATRIAL FIBRILLATION (HCC): Status: ACTIVE | Noted: 2017-05-16

## 2022-05-11 PROBLEM — I10 ESSENTIAL HYPERTENSION: Status: ACTIVE | Noted: 2017-05-16

## 2023-06-29 ENCOUNTER — APPOINTMENT (RX ONLY)
Dept: URBAN - METROPOLITAN AREA CLINIC 23 | Facility: CLINIC | Age: 58
Setting detail: DERMATOLOGY
End: 2023-06-29

## 2023-06-29 DIAGNOSIS — Z71.89 OTHER SPECIFIED COUNSELING: ICD-10-CM

## 2023-06-29 DIAGNOSIS — D18.0 HEMANGIOMA: ICD-10-CM

## 2023-06-29 DIAGNOSIS — L82.1 OTHER SEBORRHEIC KERATOSIS: ICD-10-CM

## 2023-06-29 DIAGNOSIS — Z87.2 PERSONAL HISTORY OF DISEASES OF THE SKIN AND SUBCUTANEOUS TISSUE: ICD-10-CM

## 2023-06-29 DIAGNOSIS — L57.8 OTHER SKIN CHANGES DUE TO CHRONIC EXPOSURE TO NONIONIZING RADIATION: ICD-10-CM

## 2023-06-29 PROBLEM — D18.01 HEMANGIOMA OF SKIN AND SUBCUTANEOUS TISSUE: Status: ACTIVE | Noted: 2023-06-29

## 2023-06-29 PROCEDURE — 99213 OFFICE O/P EST LOW 20 MIN: CPT

## 2023-06-29 PROCEDURE — ? COUNSELING

## 2023-06-29 ASSESSMENT — LOCATION SIMPLE DESCRIPTION DERM
LOCATION SIMPLE: ABDOMEN
LOCATION SIMPLE: RIGHT SHOULDER
LOCATION SIMPLE: LEFT THIGH
LOCATION SIMPLE: CHEST
LOCATION SIMPLE: LEFT UPPER ARM
LOCATION SIMPLE: RIGHT PRETIBIAL REGION
LOCATION SIMPLE: LEFT SHOULDER

## 2023-06-29 ASSESSMENT — LOCATION DETAILED DESCRIPTION DERM
LOCATION DETAILED: EPIGASTRIC SKIN
LOCATION DETAILED: STERNUM
LOCATION DETAILED: RIGHT PROXIMAL PRETIBIAL REGION
LOCATION DETAILED: LEFT ANTERIOR DISTAL THIGH
LOCATION DETAILED: RIGHT POSTERIOR SHOULDER
LOCATION DETAILED: LEFT ANTERIOR MEDIAL PROXIMAL UPPER ARM
LOCATION DETAILED: LEFT POSTERIOR SHOULDER

## 2023-06-29 ASSESSMENT — LOCATION ZONE DERM
LOCATION ZONE: TRUNK
LOCATION ZONE: ARM
LOCATION ZONE: LEG

## 2024-06-26 ENCOUNTER — APPOINTMENT (RX ONLY)
Dept: URBAN - METROPOLITAN AREA CLINIC 23 | Facility: CLINIC | Age: 59
Setting detail: DERMATOLOGY
End: 2024-06-26

## 2024-06-26 DIAGNOSIS — L57.8 OTHER SKIN CHANGES DUE TO CHRONIC EXPOSURE TO NONIONIZING RADIATION: ICD-10-CM

## 2024-06-26 DIAGNOSIS — Z71.89 OTHER SPECIFIED COUNSELING: ICD-10-CM

## 2024-06-26 DIAGNOSIS — D18.0 HEMANGIOMA: ICD-10-CM

## 2024-06-26 DIAGNOSIS — Z87.2 PERSONAL HISTORY OF DISEASES OF THE SKIN AND SUBCUTANEOUS TISSUE: ICD-10-CM

## 2024-06-26 PROBLEM — D18.01 HEMANGIOMA OF SKIN AND SUBCUTANEOUS TISSUE: Status: ACTIVE | Noted: 2024-06-26

## 2024-06-26 PROCEDURE — 99213 OFFICE O/P EST LOW 20 MIN: CPT

## 2024-06-26 PROCEDURE — ? COUNSELING

## 2024-06-26 ASSESSMENT — LOCATION SIMPLE DESCRIPTION DERM
LOCATION SIMPLE: LEFT THIGH
LOCATION SIMPLE: CHEST
LOCATION SIMPLE: LEFT SHOULDER
LOCATION SIMPLE: LEFT UPPER ARM
LOCATION SIMPLE: ABDOMEN
LOCATION SIMPLE: RIGHT SHOULDER

## 2024-06-26 ASSESSMENT — LOCATION ZONE DERM
LOCATION ZONE: TRUNK
LOCATION ZONE: LEG
LOCATION ZONE: ARM

## 2024-06-26 ASSESSMENT — LOCATION DETAILED DESCRIPTION DERM
LOCATION DETAILED: LEFT ANTERIOR DISTAL THIGH
LOCATION DETAILED: RIGHT POSTERIOR SHOULDER
LOCATION DETAILED: LEFT ANTERIOR MEDIAL PROXIMAL UPPER ARM
LOCATION DETAILED: STERNUM
LOCATION DETAILED: EPIGASTRIC SKIN
LOCATION DETAILED: LEFT POSTERIOR SHOULDER

## 2025-07-07 ENCOUNTER — APPOINTMENT (OUTPATIENT)
Dept: URBAN - METROPOLITAN AREA CLINIC 23 | Facility: CLINIC | Age: 60
Setting detail: DERMATOLOGY
End: 2025-07-07

## 2025-07-07 DIAGNOSIS — Z87.2 PERSONAL HISTORY OF DISEASES OF THE SKIN AND SUBCUTANEOUS TISSUE: ICD-10-CM

## 2025-07-07 DIAGNOSIS — D18.0 HEMANGIOMA: ICD-10-CM

## 2025-07-07 DIAGNOSIS — L82.1 OTHER SEBORRHEIC KERATOSIS: ICD-10-CM

## 2025-07-07 DIAGNOSIS — L57.8 OTHER SKIN CHANGES DUE TO CHRONIC EXPOSURE TO NONIONIZING RADIATION: ICD-10-CM | Status: STABLE

## 2025-07-07 DIAGNOSIS — Z71.89 OTHER SPECIFIED COUNSELING: ICD-10-CM

## 2025-07-07 PROBLEM — D18.01 HEMANGIOMA OF SKIN AND SUBCUTANEOUS TISSUE: Status: ACTIVE | Noted: 2025-07-07

## 2025-07-07 PROCEDURE — ? COUNSELING

## 2025-07-07 ASSESSMENT — LOCATION DETAILED DESCRIPTION DERM
LOCATION DETAILED: LEFT SUPERIOR LATERAL UPPER BACK
LOCATION DETAILED: EPIGASTRIC SKIN
LOCATION DETAILED: LEFT ANTERIOR MEDIAL PROXIMAL UPPER ARM
LOCATION DETAILED: LEFT ANTERIOR DISTAL THIGH
LOCATION DETAILED: STERNUM
LOCATION DETAILED: RIGHT INFERIOR UPPER BACK
LOCATION DETAILED: LEFT POSTERIOR SHOULDER
LOCATION DETAILED: RIGHT POSTERIOR SHOULDER

## 2025-07-07 ASSESSMENT — LOCATION SIMPLE DESCRIPTION DERM
LOCATION SIMPLE: CHEST
LOCATION SIMPLE: LEFT THIGH
LOCATION SIMPLE: LEFT UPPER BACK
LOCATION SIMPLE: LEFT UPPER ARM
LOCATION SIMPLE: RIGHT UPPER BACK
LOCATION SIMPLE: RIGHT SHOULDER
LOCATION SIMPLE: LEFT SHOULDER
LOCATION SIMPLE: ABDOMEN

## 2025-07-07 ASSESSMENT — LOCATION ZONE DERM
LOCATION ZONE: TRUNK
LOCATION ZONE: LEG
LOCATION ZONE: ARM

## (undated) DEVICE — 3M™ STERI-DRAPE™ INSTRUMENT POUCH 1018: Brand: STERI-DRAPE™

## (undated) DEVICE — (D)PREP SKN CHLRAPRP APPL 26ML -- CONVERT TO ITEM 371833

## (undated) DEVICE — 3000CC GUARDIAN II: Brand: GUARDIAN

## (undated) DEVICE — FAN SPRAY KIT: Brand: PULSAVAC®

## (undated) DEVICE — (D)SYR 10ML 1/5ML GRAD NSAF -- PKGING CHANGE USE ITEM 338027

## (undated) DEVICE — SKIN STAPLER: Brand: SIGNET

## (undated) DEVICE — CURETTE BNE CEM 10IN DISP --

## (undated) DEVICE — TRAY CATH 16F DRN BG LTX -- CONVERT TO ITEM 363158

## (undated) DEVICE — BUTTON SWITCH PENCIL BLADE ELECTRODE, HOLSTER: Brand: EDGE

## (undated) DEVICE — SYRINGE CATH TIP 50ML

## (undated) DEVICE — SUT ETHBND 2 30IN LR DA GRN --

## (undated) DEVICE — UTILITY MARKER,BLACK WITH LABELS: Brand: DEVON

## (undated) DEVICE — SOLUTION IRRIG 3000ML 0.9% SOD CHL FLX CONT 0797208] ICU MEDICAL INC]

## (undated) DEVICE — 3M™ COBAN™ NL STERILE NON-LATEX SELF-ADHERENT WRAP, 2084S, 4 IN X 5 YD (10 CM X 4,5 M), 18 ROLLS/CASE: Brand: 3M™ COBAN™

## (undated) DEVICE — MEDI-VAC YANKAUER SUCTION HANDLE W/BULBOUS TIP: Brand: CARDINAL HEALTH

## (undated) DEVICE — REM POLYHESIVE ADULT PATIENT RETURN ELECTRODE: Brand: VALLEYLAB

## (undated) DEVICE — 2000CC GUARDIAN II: Brand: GUARDIAN

## (undated) DEVICE — SUTURE PDS II SZ 1 L96IN ABSRB VLT TP-1 L65MM 1/2 CIR Z880G

## (undated) DEVICE — PACK PROCEDURE SURG TOT KNEE

## (undated) DEVICE — BIPOLAR SEALER 23-112-1 AQM 6.0: Brand: AQUAMANTYS ®

## (undated) DEVICE — PRECISION FALCON OSCILLATING TIP SAW CARTRIDGE: Brand: PRECISION FALCON

## (undated) DEVICE — SUTURE VCRL SZ 1 L27IN ABSRB UD L36MM CP-1 1/2 CIR REV CUT J268H

## (undated) DEVICE — SUTURE ABSRB X-1 REV CUT 1/2 CIR 22MM UD BRAID 27IN SZ 3-0 J458H

## (undated) DEVICE — SOLUTION IV 1000ML 0.9% SOD CHL

## (undated) DEVICE — TRAY PREP DRY W/ PREM GLV 2 APPL 6 SPNG 2 UNDPD 1 OVERWRAP

## (undated) DEVICE — SYR 50ML LR LCK 1ML GRAD NSAF --

## (undated) DEVICE — BLADE SAW PAT RMR PILT H 46MM --

## (undated) DEVICE — DRAPE,TOP,102X53,STERILE: Brand: MEDLINE

## (undated) DEVICE — T4 HOOD

## (undated) DEVICE — SOLUTION IV DEXTROSE/SALINE 5%-0.9% 500ML - 500ML

## (undated) DEVICE — SUTURE STRATAFIX SYMMETRIC PDS + SZ 2-0 L18IN ABSRB VLT SXPP1A403

## (undated) DEVICE — X-LARGE COTTON GLOVE: Brand: DEROYAL

## (undated) DEVICE — SYR LR LCK 1ML GRAD NSAF 30ML --

## (undated) DEVICE — Z DISCONTINUED USE 2744636  DRESSING AQUACEL 14 IN ALG W3.5XL14IN POLYUR FLM CVR W/ HYDRCOLL